# Patient Record
Sex: FEMALE | Race: WHITE | NOT HISPANIC OR LATINO | Employment: OTHER | URBAN - METROPOLITAN AREA
[De-identification: names, ages, dates, MRNs, and addresses within clinical notes are randomized per-mention and may not be internally consistent; named-entity substitution may affect disease eponyms.]

---

## 2017-07-13 ENCOUNTER — TRANSCRIBE ORDERS (OUTPATIENT)
Dept: ADMINISTRATIVE | Facility: HOSPITAL | Age: 67
End: 2017-07-13

## 2017-07-13 DIAGNOSIS — Z12.31 ENCOUNTER FOR MAMMOGRAM TO ESTABLISH BASELINE MAMMOGRAM: Primary | ICD-10-CM

## 2017-08-02 ENCOUNTER — HOSPITAL ENCOUNTER (OUTPATIENT)
Dept: MAMMOGRAPHY | Facility: CLINIC | Age: 67
Discharge: HOME | End: 2017-08-02
Payer: MEDICARE

## 2017-08-02 DIAGNOSIS — Z12.31 ENCOUNTER FOR MAMMOGRAM TO ESTABLISH BASELINE MAMMOGRAM: ICD-10-CM

## 2017-08-02 PROCEDURE — G0202 SCR MAMMO BI INCL CAD: HCPCS

## 2017-08-02 PROCEDURE — 77063 BREAST TOMOSYNTHESIS BI: CPT

## 2017-08-04 ENCOUNTER — HOSPITAL ENCOUNTER (OUTPATIENT)
Dept: CT IMAGING | Facility: HOSPITAL | Age: 67
Discharge: HOME | End: 2017-08-04
Payer: MEDICARE

## 2017-08-04 DIAGNOSIS — Z12.31 ENCOUNTER FOR MAMMOGRAM TO ESTABLISH BASELINE MAMMOGRAM: ICD-10-CM

## 2017-08-04 PROCEDURE — 71250 CT THORAX DX C-: CPT

## 2017-09-28 ENCOUNTER — OFFICE VISIT (OUTPATIENT)
Dept: URGENT CARE | Facility: CLINIC | Age: 67
End: 2017-09-28
Payer: MEDICARE

## 2017-09-28 PROCEDURE — G0463 HOSPITAL OUTPT CLINIC VISIT: HCPCS

## 2017-09-28 PROCEDURE — 99203 OFFICE O/P NEW LOW 30 MIN: CPT

## 2017-10-11 NOTE — PROGRESS NOTES
Assessment  1  Finger laceration (883 0) (A27 785H)    Plan  Finger laceration    · Dressings; Status:Complete;   Done: [id number]    Discussion/Summary  Discussion Summary:   Steri strips applied to the finger, 3 in all, wound cleaned and wrapped with splint to the fingerwell happy  Medication Side Effects Reviewed: Possible side effects of new medications were reviewed with the patient/guardian today  Understands and agrees with treatment plan: The treatment plan was reviewed with the patient/guardian  The patient/guardian understands and agrees with the treatment plan   Counseling Documentation With Imm: The patient, patient's family was counseled regarding instructions for management,-patient and family education,-importance of compliance with treatment  Follow Up Instructions: Follow Up with your Primary Care Provider in 1-2 days  If your symptoms worsen, go to the nearest [facility] Emergency Department  Chief Complaint  1  Skin Lesions  Chief Complaint Free Text Note Form: Pt states she cut her left middle finger on a knife      History of Present Illness  HPI: 80 yo female with 3cm lac to the middle finger on the left hand  Injury happened one hr ago  Bleeding controlled on arrival, no pain to the area  Discussed with pt about small sutures to the area, steri strips to the wound with splint and pt refused sutures and wanted steri strips  She was offered to have topical ointment to the wound to suture and refused, Pt was also offered tetanus due to not knowing when last was and refused  Pt was happy with treatment and happy with steri strips to the left middle finger  Hospital Based Practices Required Assessment:   Pain Assessment   the patient states they have pain  (on a scale of 0 to 10, the patient rates the pain at 1 )   Abuse And Domestic Violence Screen   Domestic violence screen not done today  Reason DV Screen not done: not alone    Depression And Suicide Screen     Suicide screen not done today  -Reason suicide screen not done: not alone  Prefered Language is  [place]  Primary Language is  English  Review of Systems  Focused-Female:   Constitutional: as noted in HPI  Musculoskeletal: no complaints of arthralgia, no myalgia, no joint swelling or stiffness, no limb pain or swelling  Integumentary: as noted in HPI  ROS Reviewed:   ROS reviewed  Social History   · Alcohol use (V49 89) (Z78 9)   · Caffeine use (V49 89) (F15 90)   · Never a smoker  Social History Reviewed: The social history was reviewed and updated today  The social history was reviewed and is unchanged  Current Meds   1  Fosamax 10 MG TABS; Therapy: (Recorded:[id number]) to Recorded  Medication List Reviewed: The medication list was reviewed and updated today  Allergies  1  No Known Drug Allergies  2  No Known Environmental Allergies   3  No Known Food Allergies    Vitals  Signs   Recorded: [id number] 06:36PM   Temperature: 97 6 F  Heart Rate: 84  Respiration: 16  Systolic: 800  Diastolic: 76  Height: 5 ft 7 in  Weight: 129 lb   BMI Calculated: 20 2  BSA Calculated: 1 68  Pain Scale: 197    Physical Exam    Constitutional   General appearance: No acute distress, well appearing and well nourished  Musculoskeletal   Digits and nails: Abnormal  -small lac 2 cm to the distal middle finger of theleft hand, bleeding controlled, wound linear, no deficits, wound superficial    Inspection/palpation of joints, bones, and muscles: Normal  -FROM  Skin   Skin and subcutaneous tissue: Abnormal  -no s/s of infection, wound cleansed with saline and betadine,  Neurologic   Sensation: No sensory loss  Psychiatric   Orientation to person, place, and time: Normal     Mood and affect: Normal        Procedure    Procedure: wound repair  The wound was located on the and was 2 cm in length (left middle finger)  The wound was linear-and-was clean  the neurovascular exam was normal  there was no tendon injury  The site was prepped with Betadine and irrigated extensively  refused topical LET and sutures to the wound, steri strips wanted   Closure: The cutaneous layer was closed with Steri-Strips  Dressing: a sterile dressing was placed  Patient Status:  the patient tolerated the procedure well  There were no complications      Signatures   Electronically signed by :  ANASTASIA Jeff; Sep 28 2017  7:11PM EST                       (Author)    Electronically signed by : Jayjay Luna DO; Oct 10 2017  7:02PM EST                       (Co-author)

## 2018-04-16 ENCOUNTER — HOSPITAL ENCOUNTER (OUTPATIENT)
Dept: BONE DENSITY | Facility: IMAGING CENTER | Age: 68
Discharge: HOME | End: 2018-04-16
Payer: MEDICARE

## 2018-04-16 DIAGNOSIS — Z12.31 ENCOUNTER FOR MAMMOGRAM TO ESTABLISH BASELINE MAMMOGRAM: ICD-10-CM

## 2018-04-16 PROCEDURE — 77080 DXA BONE DENSITY AXIAL: CPT

## 2018-07-24 ENCOUNTER — TRANSCRIBE ORDERS (OUTPATIENT)
Dept: ADMINISTRATIVE | Facility: HOSPITAL | Age: 68
End: 2018-07-24

## 2018-07-24 DIAGNOSIS — Z12.39 SCREENING BREAST EXAMINATION: Primary | ICD-10-CM

## 2018-08-06 ENCOUNTER — HOSPITAL ENCOUNTER (OUTPATIENT)
Dept: MAMMOGRAPHY | Facility: CLINIC | Age: 68
Discharge: HOME | End: 2018-08-06
Payer: MEDICARE

## 2018-08-06 ENCOUNTER — TRANSCRIBE ORDERS (OUTPATIENT)
Dept: ADMINISTRATIVE | Facility: HOSPITAL | Age: 68
End: 2018-08-06

## 2018-08-06 DIAGNOSIS — Z12.39 SCREENING BREAST EXAMINATION: ICD-10-CM

## 2018-08-06 DIAGNOSIS — R91.1 LUNG NODULE: Primary | ICD-10-CM

## 2018-08-06 PROCEDURE — 77063 BREAST TOMOSYNTHESIS BI: CPT

## 2018-08-06 PROCEDURE — 77067 SCR MAMMO BI INCL CAD: CPT

## 2018-08-09 ENCOUNTER — TRANSCRIBE ORDERS (OUTPATIENT)
Dept: ADMINISTRATIVE | Facility: HOSPITAL | Age: 68
End: 2018-08-09

## 2018-08-09 ENCOUNTER — APPOINTMENT (OUTPATIENT)
Dept: LAB | Facility: HOSPITAL | Age: 68
End: 2018-08-09
Payer: MEDICARE

## 2018-08-09 DIAGNOSIS — D50.0 IRON DEFICIENCY ANEMIA DUE TO CHRONIC BLOOD LOSS: ICD-10-CM

## 2018-08-09 DIAGNOSIS — E78.00 PURE HYPERCHOLESTEROLEMIA: ICD-10-CM

## 2018-08-09 DIAGNOSIS — E08.00 DIABETES MELLITUS DUE TO UNDERLYING CONDITION WITH HYPEROSMOLARITY WITHOUT COMA, WITHOUT LONG-TERM CURRENT USE OF INSULIN (HCC): ICD-10-CM

## 2018-08-09 DIAGNOSIS — N39.0 URINARY TRACT INFECTION WITHOUT HEMATURIA, SITE UNSPECIFIED: ICD-10-CM

## 2018-08-09 DIAGNOSIS — E08.00 DIABETES MELLITUS DUE TO UNDERLYING CONDITION WITH HYPEROSMOLARITY WITHOUT COMA, WITHOUT LONG-TERM CURRENT USE OF INSULIN (HCC): Primary | ICD-10-CM

## 2018-08-09 DIAGNOSIS — E03.9 MYXEDEMA HEART DISEASE: ICD-10-CM

## 2018-08-09 DIAGNOSIS — I51.9 MYXEDEMA HEART DISEASE: ICD-10-CM

## 2018-08-09 LAB
ALBUMIN SERPL BCP-MCNC: 3.8 G/DL (ref 3.5–5)
ALP SERPL-CCNC: 74 U/L (ref 46–116)
ALT SERPL W P-5'-P-CCNC: 30 U/L (ref 12–78)
ANION GAP SERPL CALCULATED.3IONS-SCNC: 4 MMOL/L (ref 4–13)
AST SERPL W P-5'-P-CCNC: 20 U/L (ref 5–45)
BACTERIA UR QL AUTO: ABNORMAL /HPF
BASOPHILS # BLD AUTO: 0.03 THOUSANDS/ΜL (ref 0–0.1)
BASOPHILS NFR BLD AUTO: 1 % (ref 0–1)
BILIRUB SERPL-MCNC: 0.6 MG/DL (ref 0.2–1)
BILIRUB UR QL STRIP: NEGATIVE
BUN SERPL-MCNC: 9 MG/DL (ref 5–25)
CALCIUM SERPL-MCNC: 9.4 MG/DL (ref 8.3–10.1)
CHLORIDE SERPL-SCNC: 104 MMOL/L (ref 100–108)
CHOLEST SERPL-MCNC: 215 MG/DL (ref 50–200)
CLARITY UR: CLEAR
CO2 SERPL-SCNC: 32 MMOL/L (ref 21–32)
COLOR UR: YELLOW
CREAT SERPL-MCNC: 0.62 MG/DL (ref 0.6–1.3)
EOSINOPHIL # BLD AUTO: 0.06 THOUSAND/ΜL (ref 0–0.61)
EOSINOPHIL NFR BLD AUTO: 1 % (ref 0–6)
ERYTHROCYTE [DISTWIDTH] IN BLOOD BY AUTOMATED COUNT: 13.1 % (ref 11.6–15.1)
GFR SERPL CREATININE-BSD FRML MDRD: 94 ML/MIN/1.73SQ M
GLUCOSE P FAST SERPL-MCNC: 95 MG/DL (ref 65–99)
GLUCOSE UR STRIP-MCNC: NEGATIVE MG/DL
HCT VFR BLD AUTO: 38.9 % (ref 34.8–46.1)
HDLC SERPL-MCNC: 118 MG/DL (ref 40–60)
HGB BLD-MCNC: 12.9 G/DL (ref 11.5–15.4)
HGB UR QL STRIP.AUTO: NEGATIVE
IMM GRANULOCYTES # BLD AUTO: 0.01 THOUSAND/UL (ref 0–0.2)
IMM GRANULOCYTES NFR BLD AUTO: 0 % (ref 0–2)
KETONES UR STRIP-MCNC: NEGATIVE MG/DL
LDLC SERPL CALC-MCNC: 87 MG/DL (ref 0–100)
LEUKOCYTE ESTERASE UR QL STRIP: ABNORMAL
LYMPHOCYTES # BLD AUTO: 2.21 THOUSANDS/ΜL (ref 0.6–4.47)
LYMPHOCYTES NFR BLD AUTO: 45 % (ref 14–44)
MCH RBC QN AUTO: 32 PG (ref 26.8–34.3)
MCHC RBC AUTO-ENTMCNC: 33.2 G/DL (ref 31.4–37.4)
MCV RBC AUTO: 97 FL (ref 82–98)
MONOCYTES # BLD AUTO: 0.42 THOUSAND/ΜL (ref 0.17–1.22)
MONOCYTES NFR BLD AUTO: 9 % (ref 4–12)
NEUTROPHILS # BLD AUTO: 2.24 THOUSANDS/ΜL (ref 1.85–7.62)
NEUTS SEG NFR BLD AUTO: 44 % (ref 43–75)
NITRITE UR QL STRIP: NEGATIVE
NON-SQ EPI CELLS URNS QL MICRO: ABNORMAL /HPF
NONHDLC SERPL-MCNC: 97 MG/DL
NRBC BLD AUTO-RTO: 0 /100 WBCS
PH UR STRIP.AUTO: 7 [PH] (ref 4.5–8)
PLATELET # BLD AUTO: 269 THOUSANDS/UL (ref 149–390)
PMV BLD AUTO: 10.1 FL (ref 8.9–12.7)
POTASSIUM SERPL-SCNC: 3.9 MMOL/L (ref 3.5–5.3)
PROT SERPL-MCNC: 7 G/DL (ref 6.4–8.2)
PROT UR STRIP-MCNC: NEGATIVE MG/DL
RBC # BLD AUTO: 4.03 MILLION/UL (ref 3.81–5.12)
RBC #/AREA URNS AUTO: ABNORMAL /HPF
SODIUM SERPL-SCNC: 140 MMOL/L (ref 136–145)
SP GR UR STRIP.AUTO: 1.01 (ref 1–1.03)
TRIGL SERPL-MCNC: 51 MG/DL
TSH SERPL DL<=0.05 MIU/L-ACNC: 0.66 UIU/ML (ref 0.36–3.74)
UROBILINOGEN UR QL STRIP.AUTO: 0.2 E.U./DL
WBC # BLD AUTO: 4.97 THOUSAND/UL (ref 4.31–10.16)
WBC #/AREA URNS AUTO: ABNORMAL /HPF

## 2018-08-09 PROCEDURE — 84443 ASSAY THYROID STIM HORMONE: CPT

## 2018-08-09 PROCEDURE — 80061 LIPID PANEL: CPT

## 2018-08-09 PROCEDURE — 85025 COMPLETE CBC W/AUTO DIFF WBC: CPT

## 2018-08-09 PROCEDURE — 80053 COMPREHEN METABOLIC PANEL: CPT

## 2018-08-09 PROCEDURE — 81001 URINALYSIS AUTO W/SCOPE: CPT | Performed by: FAMILY MEDICINE

## 2018-08-09 PROCEDURE — 36415 COLL VENOUS BLD VENIPUNCTURE: CPT

## 2018-08-10 ENCOUNTER — HOSPITAL ENCOUNTER (OUTPATIENT)
Dept: CT IMAGING | Facility: HOSPITAL | Age: 68
Discharge: HOME | End: 2018-08-10
Payer: MEDICARE

## 2018-08-10 DIAGNOSIS — R91.1 LUNG NODULE: ICD-10-CM

## 2018-08-10 PROCEDURE — 71250 CT THORAX DX C-: CPT

## 2019-10-10 ENCOUNTER — TRANSCRIBE ORDERS (OUTPATIENT)
Dept: ADMINISTRATIVE | Facility: HOSPITAL | Age: 69
End: 2019-10-10

## 2019-10-10 DIAGNOSIS — D51.0 PERNICIOUS ANEMIA: ICD-10-CM

## 2019-10-10 DIAGNOSIS — E08.00 DIABETES MELLITUS DUE TO UNDERLYING CONDITION WITH HYPEROSMOLARITY WITHOUT COMA, WITHOUT LONG-TERM CURRENT USE OF INSULIN (HCC): ICD-10-CM

## 2019-10-10 DIAGNOSIS — D50.0 IRON DEFICIENCY ANEMIA DUE TO CHRONIC BLOOD LOSS: ICD-10-CM

## 2019-10-10 DIAGNOSIS — Z12.31 SCREENING MAMMOGRAM, ENCOUNTER FOR: Primary | ICD-10-CM

## 2019-10-10 DIAGNOSIS — D59.2: ICD-10-CM

## 2019-10-10 DIAGNOSIS — E55.9 AVITAMINOSIS D: ICD-10-CM

## 2019-10-10 DIAGNOSIS — E78.9 DISORDER OF LIPOPROTEIN AND LIPID METABOLISM: ICD-10-CM

## 2019-10-25 ENCOUNTER — APPOINTMENT (OUTPATIENT)
Dept: LAB | Facility: HOSPITAL | Age: 69
End: 2019-10-25
Payer: MEDICARE

## 2019-10-25 DIAGNOSIS — D59.2: ICD-10-CM

## 2019-10-25 DIAGNOSIS — E78.9 DISORDER OF LIPOPROTEIN AND LIPID METABOLISM: ICD-10-CM

## 2019-10-25 DIAGNOSIS — D50.0 IRON DEFICIENCY ANEMIA DUE TO CHRONIC BLOOD LOSS: ICD-10-CM

## 2019-10-25 DIAGNOSIS — Z12.31 SCREENING MAMMOGRAM, ENCOUNTER FOR: ICD-10-CM

## 2019-10-25 DIAGNOSIS — E55.9 AVITAMINOSIS D: ICD-10-CM

## 2019-10-25 DIAGNOSIS — E08.00 DIABETES MELLITUS DUE TO UNDERLYING CONDITION WITH HYPEROSMOLARITY WITHOUT COMA, WITHOUT LONG-TERM CURRENT USE OF INSULIN (HCC): ICD-10-CM

## 2019-10-25 DIAGNOSIS — D51.0 PERNICIOUS ANEMIA: ICD-10-CM

## 2019-10-25 LAB
25(OH)D3 SERPL-MCNC: 30.1 NG/ML (ref 30–100)
ALBUMIN SERPL BCP-MCNC: 3.6 G/DL (ref 3.5–5)
ALP SERPL-CCNC: 65 U/L (ref 46–116)
ALT SERPL W P-5'-P-CCNC: 23 U/L (ref 12–78)
ANION GAP SERPL CALCULATED.3IONS-SCNC: 9 MMOL/L (ref 4–13)
AST SERPL W P-5'-P-CCNC: 18 U/L (ref 5–45)
BACTERIA UR QL AUTO: ABNORMAL /HPF
BASOPHILS # BLD AUTO: 0.02 THOUSANDS/ΜL (ref 0–0.1)
BASOPHILS NFR BLD AUTO: 1 % (ref 0–1)
BILIRUB SERPL-MCNC: 0.6 MG/DL (ref 0.2–1)
BILIRUB UR QL STRIP: NEGATIVE
BUN SERPL-MCNC: 7 MG/DL (ref 5–25)
CALCIUM SERPL-MCNC: 8.4 MG/DL (ref 8.3–10.1)
CHLORIDE SERPL-SCNC: 104 MMOL/L (ref 100–108)
CHOLEST SERPL-MCNC: 197 MG/DL (ref 50–200)
CLARITY UR: ABNORMAL
CO2 SERPL-SCNC: 28 MMOL/L (ref 21–32)
COLOR UR: YELLOW
CREAT SERPL-MCNC: 0.58 MG/DL (ref 0.6–1.3)
EOSINOPHIL # BLD AUTO: 0.08 THOUSAND/ΜL (ref 0–0.61)
EOSINOPHIL NFR BLD AUTO: 3 % (ref 0–6)
ERYTHROCYTE [DISTWIDTH] IN BLOOD BY AUTOMATED COUNT: 12.6 % (ref 11.6–15.1)
FOLATE SERPL-MCNC: >20 NG/ML (ref 3.1–17.5)
GFR SERPL CREATININE-BSD FRML MDRD: 94 ML/MIN/1.73SQ M
GLUCOSE P FAST SERPL-MCNC: 91 MG/DL (ref 65–99)
GLUCOSE UR STRIP-MCNC: NEGATIVE MG/DL
HCT VFR BLD AUTO: 39.7 % (ref 34.8–46.1)
HDLC SERPL-MCNC: 103 MG/DL
HGB BLD-MCNC: 13 G/DL (ref 11.5–15.4)
HGB UR QL STRIP.AUTO: NEGATIVE
IMM GRANULOCYTES # BLD AUTO: 0.01 THOUSAND/UL (ref 0–0.2)
IMM GRANULOCYTES NFR BLD AUTO: 0 % (ref 0–2)
KETONES UR STRIP-MCNC: NEGATIVE MG/DL
LDLC SERPL CALC-MCNC: 87 MG/DL (ref 0–100)
LEUKOCYTE ESTERASE UR QL STRIP: ABNORMAL
LYMPHOCYTES # BLD AUTO: 1.45 THOUSANDS/ΜL (ref 0.6–4.47)
LYMPHOCYTES NFR BLD AUTO: 46 % (ref 14–44)
MCH RBC QN AUTO: 32 PG (ref 26.8–34.3)
MCHC RBC AUTO-ENTMCNC: 32.7 G/DL (ref 31.4–37.4)
MCV RBC AUTO: 98 FL (ref 82–98)
MONOCYTES # BLD AUTO: 0.32 THOUSAND/ΜL (ref 0.17–1.22)
MONOCYTES NFR BLD AUTO: 10 % (ref 4–12)
MUCOUS THREADS UR QL AUTO: ABNORMAL
NEUTROPHILS # BLD AUTO: 1.25 THOUSANDS/ΜL (ref 1.85–7.62)
NEUTS SEG NFR BLD AUTO: 40 % (ref 43–75)
NITRITE UR QL STRIP: NEGATIVE
NON-SQ EPI CELLS URNS QL MICRO: ABNORMAL /HPF
NONHDLC SERPL-MCNC: 94 MG/DL
NRBC BLD AUTO-RTO: 0 /100 WBCS
PH UR STRIP.AUTO: 7.5 [PH]
PLATELET # BLD AUTO: 276 THOUSANDS/UL (ref 149–390)
PMV BLD AUTO: 9.9 FL (ref 8.9–12.7)
POTASSIUM SERPL-SCNC: 4 MMOL/L (ref 3.5–5.3)
PROT SERPL-MCNC: 6.7 G/DL (ref 6.4–8.2)
PROT UR STRIP-MCNC: NEGATIVE MG/DL
RBC # BLD AUTO: 4.06 MILLION/UL (ref 3.81–5.12)
RBC #/AREA URNS AUTO: ABNORMAL /HPF
SODIUM SERPL-SCNC: 141 MMOL/L (ref 136–145)
SP GR UR STRIP.AUTO: 1.01 (ref 1–1.03)
T4 FREE SERPL-MCNC: 1 NG/DL (ref 0.76–1.46)
TRIGL SERPL-MCNC: 37 MG/DL
TSH SERPL DL<=0.05 MIU/L-ACNC: 1.18 UIU/ML (ref 0.36–3.74)
UROBILINOGEN UR QL STRIP.AUTO: 0.2 E.U./DL
VIT B12 SERPL-MCNC: 256 PG/ML (ref 100–900)
WBC # BLD AUTO: 3.13 THOUSAND/UL (ref 4.31–10.16)
WBC #/AREA URNS AUTO: ABNORMAL /HPF

## 2019-10-25 PROCEDURE — 85025 COMPLETE CBC W/AUTO DIFF WBC: CPT

## 2019-10-25 PROCEDURE — 36415 COLL VENOUS BLD VENIPUNCTURE: CPT

## 2019-10-25 PROCEDURE — 81001 URINALYSIS AUTO W/SCOPE: CPT | Performed by: FAMILY MEDICINE

## 2019-10-25 PROCEDURE — 84439 ASSAY OF FREE THYROXINE: CPT

## 2019-10-25 PROCEDURE — 82306 VITAMIN D 25 HYDROXY: CPT

## 2019-10-25 PROCEDURE — 80053 COMPREHEN METABOLIC PANEL: CPT

## 2019-10-25 PROCEDURE — 82607 VITAMIN B-12: CPT

## 2019-10-25 PROCEDURE — 84443 ASSAY THYROID STIM HORMONE: CPT

## 2019-10-25 PROCEDURE — 87086 URINE CULTURE/COLONY COUNT: CPT

## 2019-10-25 PROCEDURE — 82746 ASSAY OF FOLIC ACID SERUM: CPT

## 2019-10-25 PROCEDURE — 80061 LIPID PANEL: CPT

## 2019-10-27 LAB
BACTERIA UR CULT: NORMAL
BACTERIA UR CULT: NORMAL

## 2019-10-30 ENCOUNTER — HOSPITAL ENCOUNTER (OUTPATIENT)
Dept: MAMMOGRAPHY | Facility: CLINIC | Age: 69
Discharge: HOME | End: 2019-10-30
Payer: MEDICARE

## 2019-10-30 VITALS — BODY MASS INDEX: 20.4 KG/M2 | HEIGHT: 67 IN | WEIGHT: 130 LBS

## 2019-10-30 DIAGNOSIS — Z12.31 SCREENING MAMMOGRAM, ENCOUNTER FOR: ICD-10-CM

## 2019-10-30 PROCEDURE — 77067 SCR MAMMO BI INCL CAD: CPT

## 2019-10-30 PROCEDURE — 77063 BREAST TOMOSYNTHESIS BI: CPT

## 2019-11-04 ENCOUNTER — TRANSCRIBE ORDERS (OUTPATIENT)
Dept: ADMINISTRATIVE | Facility: HOSPITAL | Age: 69
End: 2019-11-04

## 2019-11-04 DIAGNOSIS — R91.1 COIN LESION: Primary | ICD-10-CM

## 2019-11-08 ENCOUNTER — HOSPITAL ENCOUNTER (OUTPATIENT)
Dept: CT IMAGING | Facility: HOSPITAL | Age: 69
Discharge: HOME | End: 2019-11-08
Payer: MEDICARE

## 2019-11-08 ENCOUNTER — TRANSCRIBE ORDERS (OUTPATIENT)
Dept: ADMINISTRATIVE | Facility: HOSPITAL | Age: 69
End: 2019-11-08

## 2019-11-08 ENCOUNTER — APPOINTMENT (OUTPATIENT)
Dept: LAB | Facility: HOSPITAL | Age: 69
End: 2019-11-08
Payer: MEDICARE

## 2019-11-08 DIAGNOSIS — E78.5 HYPERLIPIDEMIA, UNSPECIFIED HYPERLIPIDEMIA TYPE: Primary | ICD-10-CM

## 2019-11-08 DIAGNOSIS — D64.9 ANEMIA, UNSPECIFIED TYPE: ICD-10-CM

## 2019-11-08 DIAGNOSIS — E78.5 HYPERLIPIDEMIA, UNSPECIFIED HYPERLIPIDEMIA TYPE: ICD-10-CM

## 2019-11-08 DIAGNOSIS — R91.1 COIN LESION: ICD-10-CM

## 2019-11-08 LAB
BASOPHILS # BLD AUTO: 0.05 THOUSANDS/ΜL (ref 0–0.1)
BASOPHILS NFR BLD AUTO: 1 % (ref 0–1)
EOSINOPHIL # BLD AUTO: 0.07 THOUSAND/ΜL (ref 0–0.61)
EOSINOPHIL NFR BLD AUTO: 1 % (ref 0–6)
ERYTHROCYTE [DISTWIDTH] IN BLOOD BY AUTOMATED COUNT: 12.5 % (ref 11.6–15.1)
HCT VFR BLD AUTO: 39 % (ref 34.8–46.1)
HGB BLD-MCNC: 12.7 G/DL (ref 11.5–15.4)
IMM GRANULOCYTES # BLD AUTO: 0.01 THOUSAND/UL (ref 0–0.2)
IMM GRANULOCYTES NFR BLD AUTO: 0 % (ref 0–2)
LYMPHOCYTES # BLD AUTO: 2.27 THOUSANDS/ΜL (ref 0.6–4.47)
LYMPHOCYTES NFR BLD AUTO: 44 % (ref 14–44)
MCH RBC QN AUTO: 32.2 PG (ref 26.8–34.3)
MCHC RBC AUTO-ENTMCNC: 32.6 G/DL (ref 31.4–37.4)
MCV RBC AUTO: 99 FL (ref 82–98)
MONOCYTES # BLD AUTO: 0.53 THOUSAND/ΜL (ref 0.17–1.22)
MONOCYTES NFR BLD AUTO: 10 % (ref 4–12)
NEUTROPHILS # BLD AUTO: 2.28 THOUSANDS/ΜL (ref 1.85–7.62)
NEUTS SEG NFR BLD AUTO: 44 % (ref 43–75)
NRBC BLD AUTO-RTO: 0 /100 WBCS
PLATELET # BLD AUTO: 315 THOUSANDS/UL (ref 149–390)
PMV BLD AUTO: 10.2 FL (ref 8.9–12.7)
RBC # BLD AUTO: 3.95 MILLION/UL (ref 3.81–5.12)
WBC # BLD AUTO: 5.21 THOUSAND/UL (ref 4.31–10.16)

## 2019-11-08 PROCEDURE — 85025 COMPLETE CBC W/AUTO DIFF WBC: CPT

## 2019-11-08 PROCEDURE — 71250 CT THORAX DX C-: CPT

## 2019-11-08 PROCEDURE — 36415 COLL VENOUS BLD VENIPUNCTURE: CPT

## 2019-11-09 ENCOUNTER — APPOINTMENT (OUTPATIENT)
Dept: LAB | Facility: HOSPITAL | Age: 69
End: 2019-11-09
Payer: MEDICARE

## 2019-11-09 ENCOUNTER — TRANSCRIBE ORDERS (OUTPATIENT)
Dept: ADMINISTRATIVE | Facility: HOSPITAL | Age: 69
End: 2019-11-09

## 2019-11-09 DIAGNOSIS — Z12.11 SPECIAL SCREENING FOR MALIGNANT NEOPLASMS, COLON: ICD-10-CM

## 2019-11-09 DIAGNOSIS — Z12.11 SPECIAL SCREENING FOR MALIGNANT NEOPLASMS, COLON: Primary | ICD-10-CM

## 2019-11-09 LAB — HEMOCCULT STL QL IA: NEGATIVE

## 2019-11-09 PROCEDURE — G0328 FECAL BLOOD SCRN IMMUNOASSAY: HCPCS

## 2020-09-28 ENCOUNTER — TRANSCRIBE ORDERS (OUTPATIENT)
Dept: ADMINISTRATIVE | Facility: HOSPITAL | Age: 70
End: 2020-09-28

## 2020-09-28 DIAGNOSIS — R91.8 LUNG MASS: Primary | ICD-10-CM

## 2020-09-28 DIAGNOSIS — E03.9 MYXEDEMA HEART DISEASE: ICD-10-CM

## 2020-09-28 DIAGNOSIS — E78.00 PURE HYPERCHOLESTEROLEMIA: ICD-10-CM

## 2020-09-28 DIAGNOSIS — Z12.31 ENCOUNTER FOR SCREENING MAMMOGRAM FOR MALIGNANT NEOPLASM OF BREAST: ICD-10-CM

## 2020-09-28 DIAGNOSIS — I51.9 MYXEDEMA HEART DISEASE: ICD-10-CM

## 2020-09-28 DIAGNOSIS — M81.0 SENILE OSTEOPOROSIS: ICD-10-CM

## 2020-09-28 DIAGNOSIS — I10 ESSENTIAL HYPERTENSION, MALIGNANT: Primary | ICD-10-CM

## 2020-10-01 ENCOUNTER — APPOINTMENT (OUTPATIENT)
Dept: LAB | Facility: HOSPITAL | Age: 70
End: 2020-10-01
Payer: MEDICARE

## 2020-10-01 DIAGNOSIS — I10 ESSENTIAL HYPERTENSION, MALIGNANT: ICD-10-CM

## 2020-10-01 DIAGNOSIS — M81.0 SENILE OSTEOPOROSIS: ICD-10-CM

## 2020-10-01 DIAGNOSIS — E03.9 MYXEDEMA HEART DISEASE: ICD-10-CM

## 2020-10-01 DIAGNOSIS — E78.00 PURE HYPERCHOLESTEROLEMIA: ICD-10-CM

## 2020-10-01 DIAGNOSIS — I51.9 MYXEDEMA HEART DISEASE: ICD-10-CM

## 2020-10-01 LAB
25(OH)D3 SERPL-MCNC: 35.7 NG/ML (ref 30–100)
ALBUMIN SERPL BCP-MCNC: 4 G/DL (ref 3.5–5)
ALP SERPL-CCNC: 65 U/L (ref 46–116)
ALT SERPL W P-5'-P-CCNC: 29 U/L (ref 12–78)
ANION GAP SERPL CALCULATED.3IONS-SCNC: 4 MMOL/L (ref 4–13)
AST SERPL W P-5'-P-CCNC: 19 U/L (ref 5–45)
BACTERIA UR QL AUTO: ABNORMAL /HPF
BASOPHILS # BLD AUTO: 0.02 THOUSANDS/ΜL (ref 0–0.1)
BASOPHILS NFR BLD AUTO: 1 % (ref 0–1)
BILIRUB SERPL-MCNC: 0.58 MG/DL (ref 0.2–1)
BILIRUB UR QL STRIP: NEGATIVE
BUN SERPL-MCNC: 9 MG/DL (ref 5–25)
CALCIUM SERPL-MCNC: 9.4 MG/DL (ref 8.3–10.1)
CHLORIDE SERPL-SCNC: 106 MMOL/L (ref 100–108)
CHOLEST SERPL-MCNC: 199 MG/DL (ref 50–200)
CLARITY UR: CLEAR
CO2 SERPL-SCNC: 28 MMOL/L (ref 21–32)
COLOR UR: YELLOW
CREAT SERPL-MCNC: 0.68 MG/DL (ref 0.6–1.3)
EOSINOPHIL # BLD AUTO: 0.06 THOUSAND/ΜL (ref 0–0.61)
EOSINOPHIL NFR BLD AUTO: 2 % (ref 0–6)
ERYTHROCYTE [DISTWIDTH] IN BLOOD BY AUTOMATED COUNT: 12.6 % (ref 11.6–15.1)
GFR SERPL CREATININE-BSD FRML MDRD: 89 ML/MIN/1.73SQ M
GLUCOSE P FAST SERPL-MCNC: 92 MG/DL (ref 65–99)
GLUCOSE UR STRIP-MCNC: NEGATIVE MG/DL
HCT VFR BLD AUTO: 39.2 % (ref 34.8–46.1)
HDLC SERPL-MCNC: 91 MG/DL
HGB BLD-MCNC: 13.2 G/DL (ref 11.5–15.4)
HGB UR QL STRIP.AUTO: NEGATIVE
HYALINE CASTS #/AREA URNS LPF: ABNORMAL /LPF
IMM GRANULOCYTES # BLD AUTO: 0.01 THOUSAND/UL (ref 0–0.2)
IMM GRANULOCYTES NFR BLD AUTO: 0 % (ref 0–2)
KETONES UR STRIP-MCNC: NEGATIVE MG/DL
LDLC SERPL CALC-MCNC: 94 MG/DL (ref 0–100)
LEUKOCYTE ESTERASE UR QL STRIP: ABNORMAL
LYMPHOCYTES # BLD AUTO: 1.97 THOUSANDS/ΜL (ref 0.6–4.47)
LYMPHOCYTES NFR BLD AUTO: 49 % (ref 14–44)
MCH RBC QN AUTO: 32.6 PG (ref 26.8–34.3)
MCHC RBC AUTO-ENTMCNC: 33.7 G/DL (ref 31.4–37.4)
MCV RBC AUTO: 97 FL (ref 82–98)
MONOCYTES # BLD AUTO: 0.45 THOUSAND/ΜL (ref 0.17–1.22)
MONOCYTES NFR BLD AUTO: 12 % (ref 4–12)
NEUTROPHILS # BLD AUTO: 1.39 THOUSANDS/ΜL (ref 1.85–7.62)
NEUTS SEG NFR BLD AUTO: 36 % (ref 43–75)
NITRITE UR QL STRIP: NEGATIVE
NON-SQ EPI CELLS URNS QL MICRO: ABNORMAL /HPF
NONHDLC SERPL-MCNC: 108 MG/DL
NRBC BLD AUTO-RTO: 0 /100 WBCS
PH UR STRIP.AUTO: 7 [PH]
PLATELET # BLD AUTO: 272 THOUSANDS/UL (ref 149–390)
PMV BLD AUTO: 10.4 FL (ref 8.9–12.7)
POTASSIUM SERPL-SCNC: 4.6 MMOL/L (ref 3.5–5.3)
PROT SERPL-MCNC: 7.2 G/DL (ref 6.4–8.2)
PROT UR STRIP-MCNC: NEGATIVE MG/DL
RBC # BLD AUTO: 4.05 MILLION/UL (ref 3.81–5.12)
RBC #/AREA URNS AUTO: ABNORMAL /HPF
SODIUM SERPL-SCNC: 138 MMOL/L (ref 136–145)
SP GR UR STRIP.AUTO: 1.01 (ref 1–1.03)
T4 FREE SERPL-MCNC: 1.14 NG/DL (ref 0.76–1.46)
TRIGL SERPL-MCNC: 71 MG/DL
TSH SERPL DL<=0.05 MIU/L-ACNC: 0.76 UIU/ML (ref 0.36–3.74)
UROBILINOGEN UR QL STRIP.AUTO: 0.2 E.U./DL
VIT B12 SERPL-MCNC: 529 PG/ML (ref 100–900)
WBC # BLD AUTO: 3.9 THOUSAND/UL (ref 4.31–10.16)
WBC #/AREA URNS AUTO: ABNORMAL /HPF

## 2020-10-01 PROCEDURE — 81001 URINALYSIS AUTO W/SCOPE: CPT | Performed by: FAMILY MEDICINE

## 2020-10-01 PROCEDURE — 84443 ASSAY THYROID STIM HORMONE: CPT

## 2020-10-01 PROCEDURE — 36415 COLL VENOUS BLD VENIPUNCTURE: CPT

## 2020-10-01 PROCEDURE — 80061 LIPID PANEL: CPT

## 2020-10-01 PROCEDURE — 82607 VITAMIN B-12: CPT

## 2020-10-01 PROCEDURE — 82306 VITAMIN D 25 HYDROXY: CPT

## 2020-10-01 PROCEDURE — 85025 COMPLETE CBC W/AUTO DIFF WBC: CPT

## 2020-10-01 PROCEDURE — 80053 COMPREHEN METABOLIC PANEL: CPT

## 2020-10-01 PROCEDURE — 84439 ASSAY OF FREE THYROXINE: CPT

## 2020-10-02 ENCOUNTER — HOSPITAL ENCOUNTER (OUTPATIENT)
Dept: CT IMAGING | Facility: HOSPITAL | Age: 70
Discharge: HOME | End: 2020-10-02
Payer: MEDICARE

## 2020-10-02 DIAGNOSIS — R91.8 LUNG MASS: ICD-10-CM

## 2020-10-02 PROCEDURE — 71250 CT THORAX DX C-: CPT

## 2020-10-02 PROCEDURE — G1004 CDSM NDSC: HCPCS

## 2020-11-02 ENCOUNTER — HOSPITAL ENCOUNTER (OUTPATIENT)
Dept: MAMMOGRAPHY | Facility: CLINIC | Age: 70
Discharge: HOME | End: 2020-11-02
Payer: MEDICARE

## 2020-11-02 VITALS — BODY MASS INDEX: 20.4 KG/M2 | HEIGHT: 67 IN | WEIGHT: 130 LBS

## 2020-11-02 DIAGNOSIS — Z12.31 ENCOUNTER FOR SCREENING MAMMOGRAM FOR MALIGNANT NEOPLASM OF BREAST: ICD-10-CM

## 2020-11-02 PROCEDURE — 77067 SCR MAMMO BI INCL CAD: CPT

## 2020-11-02 PROCEDURE — 77063 BREAST TOMOSYNTHESIS BI: CPT

## 2021-03-04 DIAGNOSIS — Z23 ENCOUNTER FOR IMMUNIZATION: ICD-10-CM

## 2021-03-19 ENCOUNTER — IMMUNIZATIONS (OUTPATIENT)
Dept: FAMILY MEDICINE CLINIC | Facility: HOSPITAL | Age: 71
End: 2021-03-19

## 2021-03-19 DIAGNOSIS — Z23 ENCOUNTER FOR IMMUNIZATION: Primary | ICD-10-CM

## 2021-03-19 PROCEDURE — 91300 SARS-COV-2 / COVID-19 MRNA VACCINE (PFIZER-BIONTECH) 30 MCG: CPT

## 2021-03-19 PROCEDURE — 0001A SARS-COV-2 / COVID-19 MRNA VACCINE (PFIZER-BIONTECH) 30 MCG: CPT

## 2021-04-09 ENCOUNTER — IMMUNIZATIONS (OUTPATIENT)
Dept: FAMILY MEDICINE CLINIC | Facility: HOSPITAL | Age: 71
End: 2021-04-09

## 2021-04-09 DIAGNOSIS — Z23 ENCOUNTER FOR IMMUNIZATION: Primary | ICD-10-CM

## 2021-04-09 PROCEDURE — 91300 SARS-COV-2 / COVID-19 MRNA VACCINE (PFIZER-BIONTECH) 30 MCG: CPT

## 2021-04-09 PROCEDURE — 0002A SARS-COV-2 / COVID-19 MRNA VACCINE (PFIZER-BIONTECH) 30 MCG: CPT

## 2021-11-23 ENCOUNTER — HOSPITAL ENCOUNTER (OUTPATIENT)
Dept: MAMMOGRAPHY | Facility: CLINIC | Age: 71
Discharge: HOME | End: 2021-11-23
Payer: MEDICARE

## 2021-11-23 VITALS — HEIGHT: 67 IN | WEIGHT: 130 LBS | BODY MASS INDEX: 20.4 KG/M2

## 2021-11-23 DIAGNOSIS — Z12.31 SCREENING MAMMOGRAM FOR HIGH-RISK PATIENT: ICD-10-CM

## 2021-11-23 PROCEDURE — 77067 SCR MAMMO BI INCL CAD: CPT

## 2021-11-23 PROCEDURE — 77063 BREAST TOMOSYNTHESIS BI: CPT

## 2022-01-19 ENCOUNTER — APPOINTMENT (OUTPATIENT)
Dept: RADIOLOGY | Facility: CLINIC | Age: 72
End: 2022-01-19
Payer: MEDICARE

## 2022-01-19 ENCOUNTER — OFFICE VISIT (OUTPATIENT)
Dept: PODIATRY | Facility: CLINIC | Age: 72
End: 2022-01-19
Payer: MEDICARE

## 2022-01-19 VITALS
HEART RATE: 80 BPM | WEIGHT: 130 LBS | HEIGHT: 67 IN | BODY MASS INDEX: 20.4 KG/M2 | DIASTOLIC BLOOD PRESSURE: 66 MMHG | SYSTOLIC BLOOD PRESSURE: 96 MMHG

## 2022-01-19 DIAGNOSIS — M21.41 PES PLANUS OF BOTH FEET: ICD-10-CM

## 2022-01-19 DIAGNOSIS — M72.2 PLANTAR FASCIAL FIBROMATOSIS OF RIGHT FOOT: Primary | ICD-10-CM

## 2022-01-19 DIAGNOSIS — M21.42 PES PLANUS OF BOTH FEET: ICD-10-CM

## 2022-01-19 PROCEDURE — 73630 X-RAY EXAM OF FOOT: CPT

## 2022-01-19 PROCEDURE — 99203 OFFICE O/P NEW LOW 30 MIN: CPT | Performed by: PODIATRIST

## 2022-01-19 RX ORDER — ALENDRONATE SODIUM 70 MG/1
1 TABLET ORAL WEEKLY
COMMUNITY
Start: 2021-12-23

## 2022-01-19 NOTE — PROGRESS NOTES
PATIENT:  Axel Castaneda  1950       ASSESSMENT:     1  Plantar fascial fibromatosis of right foot     2  Pes planus of both feet  XR foot 3+ vw right             PLAN:  1  Patient was counseled and educated on the condition and the diagnosis  2  X-ray was obtained and personally reviewed  The radiological findings were discussed with the patient  3  The exam and symptoms are consistent with plantar fibroma  The diagnosis, treatment options and prognosis were discussed with the patient  4  Instructed supportive care, home exercise, and proper footwear/ arch support  Discussed possible injection depending on the progress  Possible MRI depending on the progress  5  Sent her for arch support  Subjective:       HPI  The patient presents with chief complaint of bump on right arch  She had it for at least about 5 years  Slow growth  No significant pain with walking  No redness or edema  She also noticed her arch is falling  She denied any injury  No associated numbness or paresthesia  No significant weakness  The following portions of the patient's history were reviewed and updated as appropriate: allergies, current medications, past family history, past medical history, past social history, past surgical history and problem list   All pertinent labs and images were reviewed  Past Medical History  History reviewed  No pertinent past medical history  Past Surgical History  History reviewed  No pertinent surgical history  Allergies:  Patient has no known allergies  Medications:  Current Outpatient Medications   Medication Sig Dispense Refill    alendronate (Fosamax) 70 mg tablet Take 1 tablet by mouth once a week       No current facility-administered medications for this visit         Social History:  Social History     Socioeconomic History    Marital status: /Civil Union     Spouse name: None    Number of children: None    Years of education: None    Highest education level: None   Occupational History    None   Tobacco Use    Smoking status: Never Smoker    Smokeless tobacco: Never Used   Substance and Sexual Activity    Alcohol use: None    Drug use: None    Sexual activity: None   Other Topics Concern    None   Social History Narrative    None     Social Determinants of Health     Financial Resource Strain: Not on file   Food Insecurity: Not on file   Transportation Needs: Not on file   Physical Activity: Not on file   Stress: Not on file   Social Connections: Not on file   Intimate Partner Violence: Not on file   Housing Stability: Not on file          Review of Systems   Constitutional: Negative for appetite change, chills and fever  HENT: Negative for sore throat  Respiratory: Negative for cough and shortness of breath  Cardiovascular: Negative for chest pain and leg swelling  Gastrointestinal: Negative for diarrhea, nausea and vomiting  Musculoskeletal: Negative for gait problem  Skin: Negative for rash and wound  Allergic/Immunologic: Negative for immunocompromised state  Neurological: Negative for weakness and numbness  Hematological: Negative  Psychiatric/Behavioral: Negative for behavioral problems and confusion  Objective:      BP 96/66   Pulse 80   Ht 5' 7" (1 702 m) Comment: verbal  Wt 59 kg (130 lb)   BMI 20 36 kg/m²          Physical Exam  Vitals reviewed  Constitutional:       General: She is not in acute distress  Appearance: Normal appearance  She is not ill-appearing  HENT:      Head: Normocephalic and atraumatic  Cardiovascular:      Rate and Rhythm: Normal rate and regular rhythm  Pulses: Normal pulses  Dorsalis pedis pulses are 2+ on the right side and 2+ on the left side  Posterior tibial pulses are 2+ on the right side and 2+ on the left side  Pulmonary:      Effort: Pulmonary effort is normal  No respiratory distress     Musculoskeletal: General: Deformity present  No swelling or signs of injury  Cervical back: Normal range of motion and neck supple  Right lower leg: No edema  Left lower leg: No edema  Right foot: Normal range of motion  No foot drop  Left foot: Normal range of motion  No foot drop  Comments: Firm nodule on medial band of right plantar fascia  Flexible pes planus presents, right worse than left  Skin:     General: Skin is warm  Capillary Refill: Capillary refill takes less than 2 seconds  Coloration: Skin is not cyanotic or mottled  Findings: No abscess, ecchymosis, erythema, rash or wound  Nails: There is no clubbing  Neurological:      General: No focal deficit present  Mental Status: She is alert and oriented to person, place, and time  Cranial Nerves: No cranial nerve deficit  Sensory: No sensory deficit  Motor: No weakness  Coordination: Coordination normal    Psychiatric:         Mood and Affect: Mood normal          Behavior: Behavior normal          Thought Content:  Thought content normal          Judgment: Judgment normal

## 2022-12-09 ENCOUNTER — HOSPITAL ENCOUNTER (OUTPATIENT)
Dept: MAMMOGRAPHY | Facility: CLINIC | Age: 72
Discharge: HOME | End: 2022-12-09

## 2022-12-09 VITALS — BODY MASS INDEX: 20.42 KG/M2 | WEIGHT: 130.07 LBS | HEIGHT: 67 IN

## 2022-12-09 DIAGNOSIS — Z12.31 ENCOUNTER FOR SCREENING MAMMOGRAM FOR MALIGNANT NEOPLASM OF BREAST: ICD-10-CM

## 2022-12-13 ENCOUNTER — APPOINTMENT (OUTPATIENT)
Dept: LAB | Facility: HOSPITAL | Age: 72
End: 2022-12-13

## 2022-12-13 DIAGNOSIS — E78.00 PURE HYPERCHOLESTEROLEMIA: ICD-10-CM

## 2022-12-13 DIAGNOSIS — I51.9 MYXEDEMA HEART DISEASE: ICD-10-CM

## 2022-12-13 DIAGNOSIS — E03.9 MYXEDEMA HEART DISEASE: ICD-10-CM

## 2022-12-13 DIAGNOSIS — E55.9 AVITAMINOSIS D: ICD-10-CM

## 2022-12-13 DIAGNOSIS — I10 ESSENTIAL HYPERTENSION, MALIGNANT: ICD-10-CM

## 2022-12-13 LAB
25(OH)D3 SERPL-MCNC: 22.7 NG/ML (ref 30–100)
ALBUMIN SERPL BCP-MCNC: 3.9 G/DL (ref 3.5–5)
ALP SERPL-CCNC: 71 U/L (ref 46–116)
ALT SERPL W P-5'-P-CCNC: 20 U/L (ref 12–78)
ANION GAP SERPL CALCULATED.3IONS-SCNC: 4 MMOL/L (ref 4–13)
AST SERPL W P-5'-P-CCNC: 14 U/L (ref 5–45)
BASOPHILS # BLD AUTO: 0.03 THOUSANDS/ÂΜL (ref 0–0.1)
BASOPHILS NFR BLD AUTO: 1 % (ref 0–1)
BILIRUB SERPL-MCNC: 0.54 MG/DL (ref 0.2–1)
BUN SERPL-MCNC: 10 MG/DL (ref 5–25)
CALCIUM SERPL-MCNC: 9.2 MG/DL (ref 8.3–10.1)
CHLORIDE SERPL-SCNC: 105 MMOL/L (ref 96–108)
CHOLEST SERPL-MCNC: 197 MG/DL
CO2 SERPL-SCNC: 28 MMOL/L (ref 21–32)
CREAT SERPL-MCNC: 0.58 MG/DL (ref 0.6–1.3)
EOSINOPHIL # BLD AUTO: 0.05 THOUSAND/ÂΜL (ref 0–0.61)
EOSINOPHIL NFR BLD AUTO: 1 % (ref 0–6)
ERYTHROCYTE [DISTWIDTH] IN BLOOD BY AUTOMATED COUNT: 12.7 % (ref 11.6–15.1)
GFR SERPL CREATININE-BSD FRML MDRD: 92 ML/MIN/1.73SQ M
GLUCOSE P FAST SERPL-MCNC: 93 MG/DL (ref 65–99)
HCT VFR BLD AUTO: 40.3 % (ref 34.8–46.1)
HDLC SERPL-MCNC: 99 MG/DL
HGB BLD-MCNC: 12.8 G/DL (ref 11.5–15.4)
IMM GRANULOCYTES # BLD AUTO: 0.01 THOUSAND/UL (ref 0–0.2)
IMM GRANULOCYTES NFR BLD AUTO: 0 % (ref 0–2)
LDLC SERPL CALC-MCNC: 82 MG/DL (ref 0–100)
LYMPHOCYTES # BLD AUTO: 2.09 THOUSANDS/ÂΜL (ref 0.6–4.47)
LYMPHOCYTES NFR BLD AUTO: 50 % (ref 14–44)
MCH RBC QN AUTO: 30.8 PG (ref 26.8–34.3)
MCHC RBC AUTO-ENTMCNC: 31.8 G/DL (ref 31.4–37.4)
MCV RBC AUTO: 97 FL (ref 82–98)
MONOCYTES # BLD AUTO: 0.44 THOUSAND/ÂΜL (ref 0.17–1.22)
MONOCYTES NFR BLD AUTO: 10 % (ref 4–12)
NEUTROPHILS # BLD AUTO: 1.61 THOUSANDS/ÂΜL (ref 1.85–7.62)
NEUTS SEG NFR BLD AUTO: 38 % (ref 43–75)
NONHDLC SERPL-MCNC: 98 MG/DL
NRBC BLD AUTO-RTO: 0 /100 WBCS
PLATELET # BLD AUTO: 294 THOUSANDS/UL (ref 149–390)
PMV BLD AUTO: 10.4 FL (ref 8.9–12.7)
POTASSIUM SERPL-SCNC: 3.7 MMOL/L (ref 3.5–5.3)
PROT SERPL-MCNC: 6.8 G/DL (ref 6.4–8.4)
RBC # BLD AUTO: 4.16 MILLION/UL (ref 3.81–5.12)
SODIUM SERPL-SCNC: 137 MMOL/L (ref 135–147)
T4 FREE SERPL-MCNC: 1.03 NG/DL (ref 0.76–1.46)
TRIGL SERPL-MCNC: 79 MG/DL
TSH SERPL DL<=0.05 MIU/L-ACNC: 0.92 UIU/ML (ref 0.45–4.5)
VIT B12 SERPL-MCNC: 236 PG/ML (ref 100–900)
WBC # BLD AUTO: 4.23 THOUSAND/UL (ref 4.31–10.16)

## 2024-05-30 ENCOUNTER — VBI (OUTPATIENT)
Dept: ADMINISTRATIVE | Facility: OTHER | Age: 74
End: 2024-05-30

## 2024-08-26 ENCOUNTER — VBI (OUTPATIENT)
Dept: ADMINISTRATIVE | Facility: OTHER | Age: 74
End: 2024-08-26

## 2024-08-26 NOTE — TELEPHONE ENCOUNTER
08/26/24 9:37 AM     Chart reviewed for CRC: Colonoscopy ; nothing is submitted to the patient's insurance at this time.     Fantasma Garcia MA   [facility]

## 2024-10-06 ENCOUNTER — APPOINTMENT (EMERGENCY)
Dept: RADIOLOGY | Facility: HOSPITAL | Age: 74
DRG: 543 | End: 2024-10-06
Payer: COMMERCIAL

## 2024-10-06 ENCOUNTER — APPOINTMENT (INPATIENT)
Dept: RADIOLOGY | Facility: HOSPITAL | Age: 74
DRG: 543 | End: 2024-10-06
Payer: COMMERCIAL

## 2024-10-06 ENCOUNTER — TELEPHONE (OUTPATIENT)
Dept: RADIOLOGY | Facility: HOSPITAL | Age: 74
End: 2024-10-06

## 2024-10-06 ENCOUNTER — HOSPITAL ENCOUNTER (INPATIENT)
Facility: HOSPITAL | Age: 74
LOS: 4 days | Discharge: NURSING, REHAB OR LONG-TERM CARE | DRG: 543 | End: 2024-10-10
Attending: EMERGENCY MEDICINE | Admitting: SURGERY
Payer: COMMERCIAL

## 2024-10-06 DIAGNOSIS — S32.810A MULTIPLE CLOSED FRACTURES OF PELVIS WITH STABLE DISRUPTION OF PELVIC RING, INITIAL ENCOUNTER (HCC): ICD-10-CM
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
## 2024-10-06 DIAGNOSIS — S72.009A HIP FRACTURE (HCC): ICD-10-CM
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
## 2024-10-06 DIAGNOSIS — M25.551 RIGHT HIP PAIN: ICD-10-CM

## 2024-10-06 PROBLEM — S32.9XXA CLOSED FRACTURE OF PELVIS (HCC): Status: ACTIVE | Noted: 2024-10-06

## 2024-10-06 LAB
25(OH)D3 SERPL-MCNC: 23.3 NG/ML (ref 30–100)
ALBUMIN SERPL BCG-MCNC: 4 G/DL (ref 3.5–5)
ALP SERPL-CCNC: 61 U/L (ref 34–104)
ALT SERPL W P-5'-P-CCNC: 20 U/L (ref 7–52)
ANION GAP SERPL CALCULATED.3IONS-SCNC: 8 MMOL/L (ref 4–13)
AST SERPL W P-5'-P-CCNC: 21 U/L (ref 13–39)
BILIRUB SERPL-MCNC: 0.83 MG/DL (ref 0.2–1)
BUN SERPL-MCNC: 8 MG/DL (ref 5–25)
CALCIUM SERPL-MCNC: 8.9 MG/DL (ref 8.4–10.2)
CHLORIDE SERPL-SCNC: 103 MMOL/L (ref 96–108)
CO2 SERPL-SCNC: 26 MMOL/L (ref 21–32)
CREAT SERPL-MCNC: 0.49 MG/DL (ref 0.6–1.3)
ERYTHROCYTE [SEDIMENTATION RATE] IN BLOOD: 2 MM/HOUR (ref 0–29)
GFR SERPL CREATININE-BSD FRML MDRD: 96 ML/MIN/1.73SQ M
GLUCOSE SERPL-MCNC: 117 MG/DL (ref 65–140)
PLATELET # BLD AUTO: 187 THOUSANDS/UL (ref 149–390)
PMV BLD AUTO: 9.9 FL (ref 8.9–12.7)
POTASSIUM SERPL-SCNC: 4.1 MMOL/L (ref 3.5–5.3)
PROT SERPL-MCNC: 6.3 G/DL (ref 6.4–8.4)
PTH-INTACT SERPL-MCNC: 43.2 PG/ML (ref 12–88)
SODIUM SERPL-SCNC: 137 MMOL/L (ref 135–147)
TSH SERPL DL<=0.05 MIU/L-ACNC: 1.59 UIU/ML (ref 0.45–4.5)

## 2024-10-06 PROCEDURE — 82306 VITAMIN D 25 HYDROXY: CPT

## 2024-10-06 PROCEDURE — 83970 ASSAY OF PARATHORMONE: CPT

## 2024-10-06 PROCEDURE — 99284 EMERGENCY DEPT VISIT MOD MDM: CPT

## 2024-10-06 PROCEDURE — 72131 CT LUMBAR SPINE W/O DYE: CPT

## 2024-10-06 PROCEDURE — 80053 COMPREHEN METABOLIC PANEL: CPT

## 2024-10-06 PROCEDURE — 72192 CT PELVIS W/O DYE: CPT

## 2024-10-06 PROCEDURE — 99285 EMERGENCY DEPT VISIT HI MDM: CPT | Performed by: EMERGENCY MEDICINE

## 2024-10-06 PROCEDURE — 96374 THER/PROPH/DIAG INJ IV PUSH: CPT

## 2024-10-06 PROCEDURE — 84443 ASSAY THYROID STIM HORMONE: CPT

## 2024-10-06 PROCEDURE — 85652 RBC SED RATE AUTOMATED: CPT

## 2024-10-06 PROCEDURE — 85049 AUTOMATED PLATELET COUNT: CPT | Performed by: NURSE PRACTITIONER

## 2024-10-06 RX ORDER — ENOXAPARIN SODIUM 100 MG/ML
30 INJECTION SUBCUTANEOUS EVERY 12 HOURS
Status: DISCONTINUED | OUTPATIENT
Start: 2024-10-06 | End: 2024-10-10 | Stop reason: HOSPADM

## 2024-10-06 RX ORDER — HYDROMORPHONE HCL/PF 1 MG/ML
0.5 SYRINGE (ML) INJECTION ONCE
Status: COMPLETED | OUTPATIENT
Start: 2024-10-06 | End: 2024-10-06

## 2024-10-06 RX ORDER — ACETAMINOPHEN 325 MG/1
975 TABLET ORAL EVERY 8 HOURS SCHEDULED
Status: DISCONTINUED | OUTPATIENT
Start: 2024-10-06 | End: 2024-10-10 | Stop reason: HOSPADM

## 2024-10-06 RX ORDER — FENTANYL CITRATE 50 UG/ML
1 INJECTION, SOLUTION INTRAMUSCULAR; INTRAVENOUS ONCE
Status: COMPLETED | OUTPATIENT
Start: 2024-10-06 | End: 2024-10-06

## 2024-10-06 RX ORDER — OXYCODONE HYDROCHLORIDE 5 MG/1
5 TABLET ORAL EVERY 4 HOURS PRN
Status: DISCONTINUED | OUTPATIENT
Start: 2024-10-06 | End: 2024-10-10 | Stop reason: HOSPADM

## 2024-10-06 RX ORDER — HYDROMORPHONE HCL IN WATER/PF 6 MG/30 ML
0.2 PATIENT CONTROLLED ANALGESIA SYRINGE INTRAVENOUS
Status: DISCONTINUED | OUTPATIENT
Start: 2024-10-06 | End: 2024-10-07

## 2024-10-06 RX ORDER — DOCUSATE SODIUM 100 MG/1
100 CAPSULE, LIQUID FILLED ORAL 2 TIMES DAILY
Status: DISCONTINUED | OUTPATIENT
Start: 2024-10-06 | End: 2024-10-10 | Stop reason: HOSPADM

## 2024-10-06 RX ORDER — METHOCARBAMOL 500 MG/1
500 TABLET, FILM COATED ORAL EVERY 8 HOURS SCHEDULED
Status: DISCONTINUED | OUTPATIENT
Start: 2024-10-06 | End: 2024-10-08

## 2024-10-06 RX ORDER — SENNOSIDES 8.6 MG
2 TABLET ORAL
Status: DISCONTINUED | OUTPATIENT
Start: 2024-10-06 | End: 2024-10-10 | Stop reason: HOSPADM

## 2024-10-06 RX ADMIN — METHOCARBAMOL 500 MG: 500 TABLET ORAL at 21:32

## 2024-10-06 RX ADMIN — DOCUSATE SODIUM 100 MG: 100 CAPSULE, LIQUID FILLED ORAL at 14:32

## 2024-10-06 RX ADMIN — OXYCODONE HYDROCHLORIDE 5 MG: 5 TABLET ORAL at 17:27

## 2024-10-06 RX ADMIN — HYDROMORPHONE HYDROCHLORIDE 0.5 MG: 1 INJECTION, SOLUTION INTRAMUSCULAR; INTRAVENOUS; SUBCUTANEOUS at 11:28

## 2024-10-06 RX ADMIN — ACETAMINOPHEN 975 MG: 325 TABLET ORAL at 14:32

## 2024-10-06 RX ADMIN — SENNOSIDES 17.2 MG: 8.6 TABLET, FILM COATED ORAL at 21:32

## 2024-10-06 RX ADMIN — HYDROMORPHONE HYDROCHLORIDE 0.2 MG: 0.2 INJECTION, SOLUTION INTRAMUSCULAR; INTRAVENOUS; SUBCUTANEOUS at 15:21

## 2024-10-06 RX ADMIN — ENOXAPARIN SODIUM 30 MG: 30 INJECTION SUBCUTANEOUS at 17:26

## 2024-10-06 RX ADMIN — METHOCARBAMOL 500 MG: 500 TABLET ORAL at 14:32

## 2024-10-06 RX ADMIN — ACETAMINOPHEN 975 MG: 325 TABLET ORAL at 21:31

## 2024-10-06 NOTE — H&P
H&P - Trauma   Name: Catherine Lemon 74 y.o. female I MRN: [id number]  Unit/Bed#: ED 05 I Date of Admission: 10/6/2024   Date of Service: 10/6/2024 I Hospital Day: 0     Assessment & Plan  Fall, initial encounter  Fall while walking her small dog  PAin in RLE and pelvis with ambulation and when lying still  Unable to get up so came to ER today  Right hip pain  Scans completed  Sup and inf pubic rami fractures as well as Sacral ala  Ortho consulted and seeing patient  NWB at this time  Neurovascular checks  PT/OT  DVT prophylaxis  Hip fracture (HCC)  See above    Trauma Alert: evaluation from ER   Model of Arrival: Ambulance    Trauma Team: Attending Nate and YOLI Tran  Consultants: Orthopedics consulted and on their way to see patient     History of Present Illness   Chief Complaint: pelvis and RLE pain  Mechanism:Fall     Catherine Lemon is a 74 y.o. female who presents after a fall yesterday while walking her dog.  Managed to get home and on the sofa and then no more moving.  Came to ER today secondary to pain and being unable to move the RLE.  Neurovascular intact, takes no meds except Ibuprofen for pain.  Lives with  who is dependent on her for care.  Will contact Care management to assist.    Review of Systems   Constitutional:  Positive for activity change.   HENT: Negative.     Eyes: Negative.    Respiratory: Negative.  Negative for wheezing.    Gastrointestinal: Negative.    Endocrine: Negative.    Genitourinary: Negative.    Musculoskeletal: Negative.    Skin: Negative.    Allergic/Immunologic: Negative.    Neurological: Negative.    Hematological: Negative.    Psychiatric/Behavioral: Negative.       I have reviewed the patient's PMH, PSH, Social History, Family History, Meds, and Allergies  Immunization History   Administered Date(s) Administered    COVID-19 PFIZER VACCINE 0.3 ML IM 03/19/2021, 04/09/2021, 10/12/2021     Last Tetanus: unkown    1. Before the illness or injury that brought you to  the Emergency, did you need someone to help you on a regular basis? 0=No   2. Since the illness or injury that brought you to the Emergency, have you needed more help than usual to take care of yourself? 0=No   3. Have you been hospitalized for one or more nights during the past 6 months (excluding a stay in the Emergency Department)? 0=No   4. In general, do you see well? 0=Yes   5. In general, do you have serious problems with your memory? 0=No   6. Do you take more than three different medications everyday? 0=No   TOTAL   0     Did you order a geriatric consult if the score was 2 or greater?: no       Objective :  Temp:  [98 °F (36.7 °C)] 98 °F (36.7 °C)  HR:  [75-85] 77  BP: (126-175)/(67-93) 126/70  Resp:  [16-20] 16  SpO2:  [98 %-100 %] 98 %  O2 Device: None (Room air)    Initial Vitals:   Temperature: 98 °F (36.7 °C) (10/06/24 1115)  Pulse: 85 (10/06/24 1110)  Respirations: 20 (10/06/24 1110)  Blood Pressure: (!) 175/93 (10/06/24 1110)    Primary Survey:   Airway:        Status: patent;        Pre-hospital Interventions: none        Hospital Interventions: none  Breathing:                      Right breath sounds: normal       Left breath sounds: normal  Circulation:        Rhythm: regular          Right Pulses Left Pulses    R radial: 2+    R pedal: 2+     L radial: 2+  L femoral: 2+  L pedal: 2+       Disability:        GCS:        Right Pupil: round;  reactive         Left Pupil:  reactive      R Motor Strength L Motor Strength    R : 5/5  R dorsiflex: 5/5  R plantarflex: 5/5 L : 5/5  L dorsiflex: 5/5  L plantarflex: 5/5        Sensory:  No sensory deficit  Exposure:           Secondary Survey:  Physical Exam  Constitutional:       Appearance: Normal appearance.   HENT:      Head: Normocephalic and atraumatic.      Right Ear: External ear normal.      Left Ear: External ear normal.      Nose: Nose normal.      Mouth/Throat:      Mouth: Mucous membranes are moist.      Pharynx: Oropharynx is clear.  "  Eyes:      Extraocular Movements: Extraocular movements intact.      Pupils: Pupils are equal, round, and reactive to light.   Cardiovascular:      Rate and Rhythm: Normal rate and regular rhythm.      Heart sounds: Normal heart sounds.   Pulmonary:      Effort: Pulmonary effort is normal. No respiratory distress.      Breath sounds: Normal breath sounds.   Chest:      Chest wall: Tenderness present.   Abdominal:      General: Bowel sounds are normal. There is no distension.      Palpations: There is no mass.      Tenderness: There is no abdominal tenderness.   Genitourinary:     Comments: deferred  Musculoskeletal:         General: Tenderness and signs of injury present. No swelling.   Skin:     General: Skin is warm and dry.   Neurological:      Mental Status: She is oriented to person, place, and time.   Psychiatric:         Mood and Affect: Mood normal.         Behavior: Behavior normal.              Lab Results: I have reviewed the following results:  No results for input(s): \"WBC\", \"HGB\", \"HCT\", \"PLT\", \"BANDSPCT\", \"SODIUM\", \"K\", \"CL\", \"CO2\", \"BUN\", \"CREATININE\", \"GLUC\", \"CAIONIZED\", \"MG\", \"PHOS\", \"AST\", \"ALT\", \"ALB\", \"TBILI\", \"DBILI\", \"ALKPHOS\", \"PTT\", \"INR\", \"HSTNI0\", \"HSTNI2\", \"BNP\", \"LACTICACID\" in the last 72 hours.    Imaging Results: I have personally reviewed pertinent images saved in PACS. CT scan findings (and other pertinent positive findings on images) were discussed with radiology. My interpretation of the images/reports are as follows:  Chest Xray(s):    FAST exam(s): N/A   CT Scan(s): CT spine - No acute compression collapse of the vertebra  Fracture of the right sacral ala with mild widening of the right sacroiliac joint      Additional Xray(s):      Other Studies: CT Pelvis:  Nondisplaced crush fracture of the right sacrum. Nondisplaced fractures of the right inferior and superior pubic rami. 2 superior pubic rami fractures are noted one adjacent to the pubic symphysis and a second anterior " to the right acetabulum. FFP type   IIb fracture.  Osteoporosis.  Posterior disc extrusion at L5-S1 that extends into the left neural foramen with marked left-sided neural foraminal narrowing.

## 2024-10-06 NOTE — CONSULTS
"Consultation - Orthopedics   Name: Catherine Lemon 74 y.o. female I MRN: [id number]  Unit/Bed#: ED 05 I Date of Admission: 10/6/2024   Date of Service: 10/6/2024 I Hospital Day: 0   Inpatient consult to Orthopedic Surgery  Consult performed by: Keyur Muro MD  Consult ordered by: ANASTASIA Zamudio    Physician Requesting Evaluation: Criselda Sullivan DO   Reason for Evaluation / Principal Problem: Pelvic Fractures    Assessment & Plan  Closed fracture of pelvis (HCC)  74 y.o. female S/P fall without head strike on Saturday (10/5) with right LC1 pelvic fracture stable on physical exam, indicated for non-operative management.    Plan:   Weight bearing as tolerated BLLE  XR of pelvis  Analgesics for pain  Metabolic bone/endocrine consult/labs placed  DVT ppx ASA 81 mg BID unless medically contraindicated   Diet ok from ortho standpoint  PT/OT  Dispo: Ortho will follow  Please contact the SecureChat role,\"[facility] Ortho Role\", with any questions/concerns.    History of Present Illness   HPI: 74 y.o. female community ambulator status post ground level fall on Saturday (10/5) complaining or right sided gluteal pain. No headstrike, no LOC, not on blood thinners. Pain is sharp in character, located \"in my backside,\" acute in onset, constant in duration, severe in intensity. Exacerbating factors movement and bearing weight, remitting factors rest. Doesn't radiate, no numbness, no tingling, no open wounds noted. No other complaints at this time. Reports know past medical history. Occupation retired.       Review of Systems   Constitutional: Negative.    HENT: Negative.     Eyes: Negative.    Respiratory: Negative.     Cardiovascular: Negative.    Gastrointestinal: Negative.    Endocrine: Negative.    Genitourinary: Negative.    Musculoskeletal:  Positive for gait problem.        Pain with ambulation   Skin:  Positive for wound.        Superficial abrasion to the right elbow.   Hematological: Negative.    Psychiatric/Behavioral: " "Negative.       I have reviewed the patient's PMH, PSH, Social History, Family History, Meds, and Allergies    Past Surgical History:   History reviewed. No pertinent surgical history.    Objective :  Temp:  [98 °F (36.7 °C)] 98 °F (36.7 °C)  HR:  [75-85] 77  BP: (126-175)/(67-93) 126/70  Resp:  [16-20] 16  SpO2:  [98 %-100 %] 98 %  O2 Device: None (Room air)  Physical ExamOrtho Exam     Physical Exam:   Musculoskeletal: bilateral lower extremity  Skin intact  Tender to palpation over anterior and posterior pelvis  Leg lengths equal  ROM not assessed 2/2 known fracture  SILT sp/dp/s/s/t  Positive knee flexion/extension, ankle dorsi/plantar flexion, EHL/FHL. Pt guarding due to pain  2+ DP/PT pulse  Musculature is soft and compressible, no pain with passive stretch    Tertiary exam was negative for additional palpable stepoffs or additional bony tenderness to palpation      Lab Results: I have reviewed the following results:   No results for input(s): \"WBC\", \"HGB\", \"HCT\", \"PLT\", \"BANDSPCT\", \"BUN\", \"CREATININE\", \"PTT\", \"INR\", \"ESR\", \"CRP\" in the last 72 hours.  Blood Culture: No results found for: \"BLOODCX\"  Wound Culture: No results found for: \"WOUNDCULT\"    Imaging Results Review: I personally reviewed the following image studies/reports in PACS and discussed pertinent findings with Radiology: CT Pelvis. My interpretation of the radiology images/reports is: CT of pelvis demonstrate minimally displaced right sacral ala fracture and right inferior and superior pubic rami fractures consistent with LC1 type sacral fracture.      This consult was completed with the senior resident and attending on call.   "

## 2024-10-06 NOTE — PLAN OF CARE
Problem: Potential for Falls  Goal: Patient will remain free of falls  Description: INTERVENTIONS:  - Educate patient/family on patient safety including physical limitations  - Instruct patient to call for assistance with activity   - Consult OT/PT to assist with strengthening/mobility   - Keep Call bell within reach  - Keep bed low and locked with side rails adjusted as appropriate  - Keep care items and personal belongings within reach  - Initiate and maintain comfort rounds  - Make Fall Risk Sign visible to staff  - Offer Toileting every 2 Hours, in advance of need  - Initiate/Maintain bed alarm  - Obtain necessary fall risk management equipment:   - Apply yellow socks and bracelet for high fall risk patients  - Consider moving patient to room near nurses station  Outcome: Progressing     Problem: PAIN - ADULT  Goal: Verbalizes/displays adequate comfort level or baseline comfort level  Description: Interventions:  - Encourage patient to monitor pain and request assistance  - Assess pain using appropriate pain scale  - Administer analgesics based on type and severity of pain and evaluate response  - Implement non-pharmacological measures as appropriate and evaluate response  - Consider cultural and social influences on pain and pain management  - Notify physician/advanced practitioner if interventions unsuccessful or patient reports new pain  Outcome: Progressing     Problem: DISCHARGE PLANNING  Goal: Discharge to home or other facility with appropriate resources  Description: INTERVENTIONS:  - Identify barriers to discharge w/patient and caregiver  - Arrange for needed discharge resources and transportation as appropriate  - Identify discharge learning needs (meds, wound care, etc.)  - Arrange for interpretive services to assist at discharge as needed  - Refer to Case Management Department for coordinating discharge planning if the patient needs post-hospital services based on physician/advanced practitioner  order or complex needs related to functional status, cognitive ability, or social support system  Outcome: Progressing     Problem: Knowledge Deficit  Goal: Patient/family/caregiver demonstrates understanding of disease process, treatment plan, medications, and discharge instructions  Description: Complete learning assessment and assess knowledge base.  Interventions:  - Provide teaching at level of understanding  - Provide teaching via preferred learning methods  Outcome: Progressing     Problem: NEUROSENSORY - ADULT  Goal: Achieves stable or improved neurological status  Description: INTERVENTIONS  - Monitor and report changes in neurological status  - Monitor vital signs such as temperature, blood pressure, glucose, and any other labs ordered   - Initiate measures to prevent increased intracranial pressure  - Monitor for seizure activity and implement precautions if appropriate      Outcome: Progressing  Goal: Remains free of injury related to seizures activity  Description: INTERVENTIONS  - Maintain airway, patient safety  and administer oxygen as ordered  - Monitor patient for seizure activity, document and report duration and description of seizure to physician/advanced practitioner  - If seizure occurs,  ensure patient safety during seizure  - Reorient patient post seizure  - Seizure pads on all 4 side rails  - Instruct patient/family to notify RN of any seizure activity including if an aura is experienced  - Instruct patient/family to call for assistance with activity based on nursing assessment  - Administer anti-seizure medications if ordered    Outcome: Progressing  Goal: Achieves maximal functionality and self care  Description: INTERVENTIONS  - Monitor swallowing and airway patency with patient fatigue and changes in neurological status  - Encourage and assist patient to increase activity and self care.   - Encourage visually impaired, hearing impaired and aphasic patients to use assistive/communication  devices  Outcome: Progressing     Problem: MUSCULOSKELETAL - ADULT  Goal: Maintain or return mobility to safest level of function  Description: INTERVENTIONS:  - Assess patient's ability to carry out ADLs; assess patient's baseline for ADL function and identify physical deficits which impact ability to perform ADLs (bathing, care of mouth/teeth, toileting, grooming, dressing, etc.)  - Assess/evaluate cause of self-care deficits   - Assess range of motion  - Assess patient's mobility  - Assess patient's need for assistive devices and provide as appropriate  - Encourage maximum independence but intervene and supervise when necessary  - Involve family in performance of ADLs  - Assess for home care needs following discharge   - Consider OT consult to assist with ADL evaluation and planning for discharge  - Provide patient education as appropriate  Outcome: Progressing  Goal: Maintain proper alignment of affected body part  Description: INTERVENTIONS:  - Support, maintain and protect limb and body alignment  - Provide patient/ family with appropriate education  Outcome: Progressing     Problem: Nutrition/Hydration-ADULT  Goal: Nutrient/Hydration intake appropriate for improving, restoring or maintaining nutritional needs  Description: Monitor and assess patient's nutrition/hydration status for malnutrition. Collaborate with interdisciplinary team and initiate plan and interventions as ordered.  Monitor patient's weight and dietary intake as ordered or per policy. Utilize nutrition screening tool and intervene as necessary. Determine patient's food preferences and provide high-protein, high-caloric foods as appropriate.     INTERVENTIONS:  - Monitor oral intake, urinary output, labs, and treatment plans  - Assess nutrition and hydration status and recommend course of action  - Evaluate amount of meals eaten  - Assist patient with eating if necessary   - Allow adequate time for meals  - Recommend/ encourage appropriate  diets, oral nutritional supplements, and vitamin/mineral supplements  - Order, calculate, and assess calorie counts as needed  - Recommend, monitor, and adjust tube feedings and TPN/PPN based on assessed needs  - Assess need for intravenous fluids  - Provide specific nutrition/hydration education as appropriate  - Include patient/family/caregiver in decisions related to nutrition  Outcome: Progressing     Problem: Prexisting or High Potential for Compromised Skin Integrity  Goal: Skin integrity is maintained or improved  Description: INTERVENTIONS:  - Identify patients at risk for skin breakdown  - Assess and monitor skin integrity  - Assess and monitor nutrition and hydration status  - Monitor labs   - Assess for incontinence   - Turn and reposition patient  - Assist with mobility/ambulation  - Relieve pressure over bony prominences  - Avoid friction and shearing  - Provide appropriate hygiene as needed including keeping skin clean and dry  - Evaluate need for skin moisturizer/barrier cream  - Collaborate with interdisciplinary team   - Patient/family teaching  - Consider wound care consult   Outcome: Progressing

## 2024-10-06 NOTE — CASE MANAGEMENT
Case Management Assessment & Discharge Planning Note    Patient name Catherine Lemon  Location ED 05/ED 05 MRN [id number]  : 1950 Date 10/6/2024       Current Admission Date: 10/6/2024  Current Admission Diagnosis:Closed fracture of pelvis (HCC)  Patient Active Problem List    Diagnosis Date Noted Date Diagnosed    Closed fracture of pelvis (HCC) 10/06/2024       LOS (days): 0  Geometric Mean LOS (GMLOS) (days):   Days to GMLOS:     OBJECTIVE:    Risk of Unplanned Readmission Score: 6.51         Current admission status: Inpatient  Referral Reason: Other    Preferred Pharmacy:   [id number] - [place], [place] - [address]  [address]  [address]  [facility] [address]  Phone: [phone] Fax: [phone]    Primary Care Provider: Douglas Charles Shoenberger, MD    Primary Insurance: [employer]  Secondary Insurance:     ASSESSMENT:  Active Health Care Proxies    There are no active Health Care Proxies on file.                 Readmission Root Cause  30 Day Readmission: No    Patient Information  Admitted from:: Home  Mental Status: Alert  During Assessment patient was accompanied by: Other-Comment  Assessment information provided by:: Patient  Primary Caregiver: Self  Support Systems: Spouse/significant other  County of Residence: [place]  What city do you live in?: [place]    Activities of Daily Living Prior to Admission  Functional Status: Independent  Completes ADLs independently?: Yes  Ambulates independently?: Yes  Does patient use assisted devices?: No  Does patient currently own DME?: No                   Means of Transportation  Means of Transport to Appts:: Drives Self      Social Determinants of Health (SDOH)      Flowsheet Row Most Recent Value   Housing Stability    In the last 12 months, was there a time when you were not able to pay the mortgage or rent on time? N   In the past 12 months, how many times have you moved where you were living? 0   At any time in the  past 12 months, were you homeless or living in a shelter (including now)? N   Transportation Needs    In the past 12 months, has lack of transportation kept you from medical appointments or from getting medications? no   In the past 12 months, has lack of transportation kept you from meetings, work, or from getting things needed for daily living? No   Food Insecurity    Within the past 12 months, you worried that your food would run out before you got the money to buy more. Never true   Within the past 12 months, the food you bought just didn't last and you didn't have money to get more. Never true   Utilities    In the past 12 months has the electric, gas, oil, or water company threatened to shut off services in your home? No            DISCHARGE DETAILS:    Discharge planning discussed with:: Patient  Freedom of Choice: Yes     CM contacted family/caregiver?: Yes  Were Treatment Team discharge recommendations reviewed with patient/caregiver?: Yes  Did patient/caregiver verbalize understanding of patient care needs?: Yes        Additional Comments: CM met with pt at bedside due to concerns about pt being a cargiver for her  who is currently at home.  Pt states she is the primary care taker for her  and she does not have anyone who can be with him.  Pt states that her  is safe at home and does not think that APS or police need to be called but is asking if the hospital has any services to go check on her .  Pt states her  is mobile and can care for the dog at home. She states that what she primairly wants is for someone to check in on her . CM explained that the hospital does not have any services like that.  CM stated that CM can provide her with a list of private pay home health agencies. Pt declined list at this time stating that she will make some phone calls.  She requested a follow up tomorrow from CM to reassess needs.  Pt states a family member will be helping her   tonight.

## 2024-10-06 NOTE — ED ATTENDING ATTESTATION
10/6/2024  I, Lois Priest MD, saw and evaluated the patient. I have discussed the patient with the resident/non-physician practitioner and agree with the resident's/non-physician practitioner's findings, Plan of Care, and MDM as documented in the resident's/non-physician practitioner's note, except where noted. All available labs and Radiology studies were reviewed.  I was present for key portions of any procedure(s) performed by the resident/non-physician practitioner and I was immediately available to provide assistance.       At this point I agree with the current assessment done in the Emergency Department.  I have conducted an independent evaluation of this patient a history and physical is as follows:  Patient got her ankles tangled in her dog leash yesterday and fell onto her bottom.  Patient states that initially she was able to walk with a walker, but now cannot bear weight.  Patient complains of severe right hip pain.  Did not strike her head.  Did not lose consciousness.  No abdominal pain or shortness of breath.  Patient's only other comorbid illness is osteoporosis.  Review of systems otherwise -12 systems reviewed.  On exam the patient has no signs of trauma to the head or neck.  Her heart is regular without murmurs, rubs, or gallops.  Her lungs are clear with good air movement.  Her abdomen is soft and nontender.  Her pelvis is stable to rock.  She has tenderness over her lumbosacral spine as well as over her posterior right ischium.  The patient has no chest wall tenderness.  She has a large ecchymosis to her right forearm, but normal range of motion at the elbow and shoulder.  Her exam is otherwise benign.MEDICAL DECISION MAKING    Number and Complexity of Problems  Differential diagnosis: Mechanical fall, pelvis fracture, hip fracture, back fracture, arm fracture    Medical Decision Making Data  External documents reviewed: Patient's prior medical history reviewed, patient is on  bisphosphonate  My EKG interpretation:   My CT interpretation: Right-sided pelvic fractures  My X-ray interpretation:   My ultrasound interpretation:     CT pelvis wo contrast   Final Result      Nondisplaced crush fracture of the right sacrum. Nondisplaced fractures of the right inferior and superior pubic rami. 2 superior pubic rami fractures are noted one adjacent to the pubic symphysis and a second anterior to the right acetabulum. FFP type    IIb fracture.      Osteoporosis.      Posterior disc extrusion at L5-S1 that extends into the left neural foramen with marked left-sided neural foraminal narrowing.      The study was marked in EPIC for immediate notification.            Workstation performed: [id number]         CT lumbar spine without contrast   Final Result   No acute compression collapse of the vertebra   Fracture of the right sacral ala with mild widening of the right sacroiliac joint      Workstation performed: [id number]         XR hip/pelv 2-3 vws right if performed    (Results Pending)       Labs Reviewed - No data to display    Labs reviewed by me are significant for:     Clinical decision rules/scores are significant for:     Discussed case with:   Considered admission for:     Treatment and Disposition  ED course: Patient seen and examined, given medication for pain, will CT for fracture.  Reassessment: Patient with pelvic fractures.  Will consult trauma  Shared decision making:   Code status:      ED Course         Critical Care Time  Procedures

## 2024-10-06 NOTE — ASSESSMENT & PLAN NOTE
74 y.o. female S/P fall without head strike on Saturday (10/5) with right LC1 pelvic fracture stable on physical exam, indicated for non-operative management.    Plan:   Weight bearing as tolerated BLLE  XR of pelvis  Analgesics for pain  Metabolic bone/endocrine consult/labs placed  DVT ppx ASA 81 mg BID unless medically contraindicated   Diet ok from ortho standpoint  PT/OT  Dispo: Ortho will follow

## 2024-10-06 NOTE — TELEPHONE ENCOUNTER
Patient requests that her xrays not been today , it has been a lot today and requests not to me moved

## 2024-10-07 ENCOUNTER — APPOINTMENT (INPATIENT)
Dept: RADIOLOGY | Facility: HOSPITAL | Age: 74
DRG: 543 | End: 2024-10-07
Payer: COMMERCIAL

## 2024-10-07 PROBLEM — R93.5 ABNORMAL CT SCAN, PELVIS: Status: ACTIVE | Noted: 2024-10-07

## 2024-10-07 PROBLEM — M80.00XA OSTEOPOROSIS WITH CURRENT PATHOLOGICAL FRACTURE: Chronic | Status: ACTIVE | Noted: 2024-10-07
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
## 2024-10-07 PROCEDURE — 97163 PT EVAL HIGH COMPLEX 45 MIN: CPT

## 2024-10-07 PROCEDURE — 99222 1ST HOSP IP/OBS MODERATE 55: CPT | Performed by: INTERNAL MEDICINE

## 2024-10-07 PROCEDURE — 97167 OT EVAL HIGH COMPLEX 60 MIN: CPT

## 2024-10-07 PROCEDURE — NC001 PR NO CHARGE: Performed by: ORTHOPAEDIC SURGERY

## 2024-10-07 PROCEDURE — 99222 1ST HOSP IP/OBS MODERATE 55: CPT | Performed by: NURSE PRACTITIONER

## 2024-10-07 PROCEDURE — 99222 1ST HOSP IP/OBS MODERATE 55: CPT | Performed by: ORTHOPAEDIC SURGERY

## 2024-10-07 PROCEDURE — 73502 X-RAY EXAM HIP UNI 2-3 VIEWS: CPT

## 2024-10-07 PROCEDURE — 99232 SBSQ HOSP IP/OBS MODERATE 35: CPT | Performed by: SURGERY

## 2024-10-07 RX ORDER — GABAPENTIN 100 MG/1
100 CAPSULE ORAL 3 TIMES DAILY
Status: DISCONTINUED | OUTPATIENT
Start: 2024-10-07 | End: 2024-10-08

## 2024-10-07 RX ORDER — HYDROMORPHONE HCL IN WATER/PF 6 MG/30 ML
0.2 PATIENT CONTROLLED ANALGESIA SYRINGE INTRAVENOUS EVERY 2 HOUR PRN
Status: DISCONTINUED | OUTPATIENT
Start: 2024-10-07 | End: 2024-10-10

## 2024-10-07 RX ORDER — ONDANSETRON 2 MG/ML
4 INJECTION INTRAMUSCULAR; INTRAVENOUS EVERY 4 HOURS PRN
Status: DISCONTINUED | OUTPATIENT
Start: 2024-10-07 | End: 2024-10-10 | Stop reason: HOSPADM

## 2024-10-07 RX ADMIN — ONDANSETRON 4 MG: 2 INJECTION INTRAMUSCULAR; INTRAVENOUS at 11:03

## 2024-10-07 RX ADMIN — GABAPENTIN 100 MG: 100 CAPSULE ORAL at 11:02

## 2024-10-07 RX ADMIN — ACETAMINOPHEN 975 MG: 325 TABLET ORAL at 13:04

## 2024-10-07 RX ADMIN — DOCUSATE SODIUM 100 MG: 100 CAPSULE, LIQUID FILLED ORAL at 16:03

## 2024-10-07 RX ADMIN — METHOCARBAMOL 500 MG: 500 TABLET ORAL at 06:03

## 2024-10-07 RX ADMIN — GABAPENTIN 100 MG: 100 CAPSULE ORAL at 16:02

## 2024-10-07 RX ADMIN — ACETAMINOPHEN 975 MG: 325 TABLET ORAL at 06:03

## 2024-10-07 RX ADMIN — DOCUSATE SODIUM 100 MG: 100 CAPSULE, LIQUID FILLED ORAL at 08:36

## 2024-10-07 RX ADMIN — ENOXAPARIN SODIUM 30 MG: 30 INJECTION SUBCUTANEOUS at 17:27

## 2024-10-07 RX ADMIN — OXYCODONE HYDROCHLORIDE 5 MG: 5 TABLET ORAL at 11:03

## 2024-10-07 RX ADMIN — OXYCODONE HYDROCHLORIDE 5 MG: 5 TABLET ORAL at 16:03

## 2024-10-07 RX ADMIN — METHOCARBAMOL 500 MG: 500 TABLET ORAL at 13:04

## 2024-10-07 RX ADMIN — ENOXAPARIN SODIUM 30 MG: 30 INJECTION SUBCUTANEOUS at 06:04

## 2024-10-07 NOTE — CONSULTS
Consultation - Endocrinology   Name: Catherine Lemon 74 y.o. female I MRN: [id number]  Unit/Bed#: [facility] 619-01 I Date of Admission: 10/6/2024   Date of Service: 10/7/2024 I Hospital Day: 1  Inpatient consult to Endocrinology  Consult performed by: Arabella Farris MD  Consult ordered by: Keyur Muro MD        Physician Requesting Evaluation: Criselda Sullivan DO   Reason for Evaluation / Principal Problem: Own the Bone    Assessment & Plan  Osteoporosis with current pathological fracture  - concerning with severe  - previous DXA scan showed -2.5 T-score of Left femoral neck. She was unable to take Fosamax  - discussed pathophysiology of osteoporosis and long-term complications, including elevated risk of future fractures  - discussed different classes of medications and how they work.   - educated on preventative measures, including weight-bearing exercises and appropriate intake of calcium and vitamin D. Explained recommended amount of Calcium is 1200 mg a day and Vitamin D is 1000 U a day.  - However in her case, with low Vitamin D level, recommend she take 5000 U of Vit D a day.   - recommend she follow up outpatient for further discussion and evaluation. Patient would be an ideal candidate for [facility].         History of Present Illness   Catherine Lemon is a 74 y.o. female with Hx of Osteoporosis who presented on 10/6 after a fall.   In ER, imaging confirmed Right Pelvic Fracture, which orthopedic surgeons reported she did not need surgery.   She states that she was diagnosed with osteoporosis after a DXA scan in 2018. She was given Fosamax, but was very inconsistent on it and eventually stopped taking it.   She denies family history of bone disorder or fractures.   She denies personal history of dental carries, kidney stones, rheumatoid arthritis, inflammatory bowel disease, malabsorption, and hormone deficiency.   She denies previous steroid use, seizure medications, or cancer treatments.  She states that she  "typically is very active, but may not have \"the greatest diet\". She eats cheese, but does not eat a lot of leafy greens. She is only taking Vitamin B12 supplementation.   She had menopause in her late 40s.     Review of Systems   Constitutional:  Negative for activity change, appetite change, chills, diaphoresis, fatigue, fever and unexpected weight change.   HENT:  Negative for ear pain, sore throat, trouble swallowing and voice change.    Eyes:  Negative for pain and visual disturbance.   Respiratory:  Negative for cough and shortness of breath.    Cardiovascular:  Negative for chest pain and palpitations.   Gastrointestinal:  Negative for abdominal pain, constipation, diarrhea, nausea and vomiting.   Endocrine: Negative for cold intolerance, heat intolerance, polydipsia, polyphagia and polyuria.   Genitourinary:  Negative for dysuria and hematuria.   Musculoskeletal:  Negative for arthralgias, back pain and myalgias.   Skin:  Negative for color change and rash.   Neurological:  Negative for dizziness, tremors, seizures, syncope, weakness and light-headedness.   Psychiatric/Behavioral:  Negative for decreased concentration. The patient is not nervous/anxious and is not hyperactive.    All other systems reviewed and are negative.    I have reviewed the patient's PMH, PSH, Social History, Family History, Meds, and Allergies    Objective :  Temp:  [97.7 °F (36.5 °C)-98.1 °F (36.7 °C)] 98 °F (36.7 °C)  HR:  [69-84] 78  BP: ()/(54-78) 115/69  Resp:  [16-20] 18  SpO2:  [96 %-98 %] 98 %  O2 Device: None (Room air)    Physical Exam  Constitutional:       Appearance: Normal appearance. She is normal weight.      Comments: thin   HENT:      Head: Normocephalic and atraumatic.      Nose: Nose normal.      Mouth/Throat:      Mouth: Mucous membranes are moist.      Pharynx: Oropharynx is clear.   Eyes:      Extraocular Movements: Extraocular movements intact.      Conjunctiva/sclera: Conjunctivae normal. "   Cardiovascular:      Rate and Rhythm: Normal rate and regular rhythm.      Pulses: Normal pulses.   Pulmonary:      Effort: Pulmonary effort is normal. No respiratory distress.   Abdominal:      General: Abdomen is flat.      Palpations: Abdomen is soft.   Musculoskeletal:         General: Normal range of motion.      Cervical back: Normal range of motion.   Skin:     General: Skin is warm and dry.      Capillary Refill: Capillary refill takes less than 2 seconds.   Neurological:      Mental Status: She is alert and oriented to person, place, and time.   Psychiatric:         Mood and Affect: Mood normal.         Behavior: Behavior normal.         Judgment: Judgment normal.            Lab Results: I have reviewed the following results:CBC/BMP: No new results in last 24 hours. , Creatinine Clearance: Estimated Creatinine Clearance: 98 mL/min (A) (by C-G formula based on SCr of 0.49 mg/dL (L))., TSH:   Results from last 7 days   Lab Units 10/06/24  1754   TSH 3RD GENERATON uIU/mL 1.592

## 2024-10-07 NOTE — PLAN OF CARE
Problem: OCCUPATIONAL THERAPY ADULT  Goal: Performs self-care activities at highest level of function for planned discharge setting.  See evaluation for individualized goals.  Description: Treatment Interventions: ADL retraining, Functional transfer training, Endurance training, Patient/family training, Equipment evaluation/education, Compensatory technique education, Energy conservation, Activityengagement          See flowsheet documentation for full assessment, interventions and recommendations.   Note: Limitation: Decreased ADL status, Decreased Safe judgement during ADL, Decreased endurance, Decreased self-care trans, Decreased high-level ADLs  Prognosis: Good  Assessment: 75 YO Female SEEN FOR INITIAL OCCUPATIONAL THERAPY EVALUATION FOLLOWING ADMISSION TO [facility] S/ FALL WHILE WALKING HER DOG RESULTING IN LC1 PELVIC FX. PER ORTHO, PT IS WBAT ON BLE. PT IS FROM HOME WITH SPOUSE WHO SHE IS THE PRIMARY CAREGIVER FOR. PT REPORTS BEING FULLY INDEPENDENT AT BASELINE. PT CURRENTLY REQUIRES OVERALL MIN-MOD A WITH ADLS AND MIN A WITH TRANSFERS / FUNCTIONAL MOBILITY WITH USE OF RW- ADDITIONAL TIME REQUIRED TO COMPLETE ALL TASKS. PT IS LIMITED 2' PAIN, FATIGUE, IMPAIRED BALANCE, FALL RISK , ORTHOPEDIC RESTRICTIONS, MILDLY ANXIOUS/FEARFUL OF PAIN, LIMITED FAMILY/FRIEND SUPPORT , INACCESSIBLE HOME ENVIRONMENT, and OVERALL LIMITED ACTIVITY TOLERANCE. PT EDUCATED ON DEEP BREATHING TECHNIQUES T/O ACTIVITY, SLOWING OF PACE, ENERGY CONSERVATION TECHNIQUES FOR CARRY OVER UPON D/C, INCREASED FAMILY SUPPORT, and CONTINUE PARTICIPATION IN SELF-CARE/MOBILITY WITH STAFF WHILE IN THE HOSPITAL . The patient's raw score on the AM-PAC Daily Activity Inpatient Short Form is 16. A raw score of less than 19 suggests the patient may benefit from discharge to post-acute rehabilitation services. Please refer to the recommendation of the Occupational Therapist for safe discharge planning.  FROM AN OCCUPATIONAL THERAPY  PERSPECTIVE, CURRENTLY RECOMMEND LEVEL I RESOURCES UPON D/C. WILL CONT TO FOLLOW TO ADDRESS THE BELOW DESCRIBED GOALS.     Rehab Resource Intensity Level, OT: I (Maximum Resource Intensity)

## 2024-10-07 NOTE — ASSESSMENT & PLAN NOTE
Imaging noted posterior disc herniation at L5-S1 with left foraminal narrowing  Presented yesterday after sustaining mechanical fall after getting tangled in her dog's leash on 10/5  Patient noted to have right pelvic fractures    Imaging:    CT pelvis without 10/6/2024:Nondisplaced crush fracture of the right sacrum. Nondisplaced fractures of the right inferior and superior pubic rami. 2 superior pubic rami fractures are noted one adjacent to the pubic symphysis and a second anterior to the right acetabulum. FFP type   IIb fracture.Osteoporosis.Posterior disc extrusion at L5-S1 that extends into the left neural foramen with marked left-sided neural foraminal narrowing.    Plan:  Reviewed imaging with patient, she is not symptomatic from left-sided foraminal narrowing at L5-S1.  She has pinpoint pain in her right buttock without radiation.  He denies any back pain.  No neurosurgical invention warranted.  Medical management and pain control per primary team  Mobilize with PT/OT  DVT PPX: SCDs and Lovenox    Neurosurgery will sign off, patient to follow-up as needed or if symptoms worsen, call with any further question or concerns.

## 2024-10-07 NOTE — OCCUPATIONAL THERAPY NOTE
Occupational Therapy Evaluation     Patient Name: Catherine Lemon  Today's Date: 10/7/2024  Problem List  Active Problems:    Closed fracture of pelvis (HCC)    Fall    Abnormal CT scan, pelvis    Past Medical History  History reviewed. No pertinent past medical history.  Past Surgical History  History reviewed. No pertinent surgical history.      10/07/24 1121   OT Last Visit   OT Visit Date 10/07/24   Note Type   Note type Evaluation   Pain Assessment   Pain Assessment Tool 0-10   Pain Score 8   Pain Location/Orientation Location: Pelvis   Patient's Stated Pain Goal No pain   Hospital Pain Intervention(s) Repositioned;Ambulation/increased activity;Emotional support   Restrictions/Precautions   Weight Bearing Precautions Per Order Yes   RLE Weight Bearing Per Order WBAT   LLE Weight Bearing Per Order WBAT   Other Precautions Chair Alarm;Bed Alarm;Multiple lines;Fall Risk;Pain;WBS   Home Living   Type of Home House   Home Layout Two level;Stairs to enter with rails   Home Equipment Walker   Additional Comments NO USE OF DME AT BASELINE   Prior Function   Level of [place] Independent with ADLs;Independent with functional mobility;Independent with IADLS   Lives With Spouse   Receives Help From Family   IADLs Independent with driving;Independent with meal prep;Independent with medication management   Falls in the last 6 months 1 to 4  (1- REASON FOR ADMISSION)   Vocational Retired   Lifestyle   [employer] PT REPORTS BEING I WITH ADLS/IADLS/DRIVING PTA.   Reciprocal Relationships LIVES WITH SPOUSE, WHO SHE IS THE PRIMARY CAREGIVER FOR INCLUDING ASSIST WITH ADLS/IADLS. LOCAL SUPPORTIVE SISTER.   Service to Others RETIRED; WORKED AT A [employer]   Intrinsic Gratification ENJOYS TAKING HER DOG FOR WALKS   ADL   Eating Assistance 7  Independent   Grooming Assistance 5  Supervision/Setup   UB Bathing Assistance 4  Minimal Assistance   LB Bathing Assistance 3  Moderate Assistance   UB Dressing Assistance 4   Minimal Assistance   LB Dressing Assistance 3  Moderate Assistance   Toileting Assistance  3  Moderate Assistance   Functional Assistance 3  Moderate Assistance   Bed Mobility   Supine to Sit 3  Moderate assistance   Additional items Assist x 1;Increased time required;Verbal cues;LE management   Sit to Supine Unable to assess   Additional Comments PT LEFT OOB WITH ALL NEEDS IN REACH + CHAIR ALARM ACTIVATED.   Transfers   Sit to Stand 4  Minimal assistance   Additional items Assist x 1;Increased time required;Verbal cues   Stand to Sit 4  Minimal assistance   Additional items Assist x 1;Increased time required;Verbal cues   Functional Mobility   Functional Mobility 4  Minimal assistance   Additional items Rolling walker   Balance   Static Sitting Fair -   Static Standing Poor +   Ambulatory Poor +   Activity Tolerance   Activity Tolerance Patient limited by fatigue;Patient limited by pain   Medical Staff Made Aware PT SEEN FOR CO-EVAL WITH SKILLED PHYSICAL THERAPIST 2' TRAUAMTIC INJURIES, NEW PRECAUTIONS/LIMITATIONS, AND LIMITED ACTIVITY TOLERANCE WHICH IMPACT PERFORMANCE AND ARE A REGRESSION FROM PT'S BASELINE.   Nurse Made Aware APPROPRIATE TO SEE PER RN.   RUE Assessment   RUE Assessment WFL   LUE Assessment   LUE Assessment WFL   Hand Function   Gross Motor Coordination Functional   Fine Motor Coordination Functional   Cognition   Overall Cognitive Status WFL   Arousal/Participation Alert;Cooperative   Attention Within functional limits   Orientation Level Oriented X4   Memory Within functional limits   Following Commands Follows one step commands without difficulty   Comments PT IS PLEASANT AND COOPERATIVE. PT MILDLY ANXIOUS/FEARFUL OF PAIN AND EASILY DISTRACTED   Assessment   Limitation Decreased ADL status;Decreased Safe judgement during ADL;Decreased endurance;Decreased self-care trans;Decreased high-level ADLs   Prognosis Good   Assessment 73 YO Female SEEN FOR INITIAL OCCUPATIONAL THERAPY EVALUATION  FOLLOWING ADMISSION TO [facility] S/P FALL WHILE WALKING HER DOG RESULTING IN LC1 PELVIC FX. PER ORTHO, PT IS WBAT ON BLE. PT IS FROM HOME WITH SPOUSE WHO SHE IS THE PRIMARY CAREGIVER FOR. PT REPORTS BEING FULLY INDEPENDENT AT BASELINE. PT CURRENTLY REQUIRES OVERALL MIN-MOD A WITH ADLS AND MIN A WITH TRANSFERS / FUNCTIONAL MOBILITY WITH USE OF RW- ADDITIONAL TIME REQUIRED TO COMPLETE ALL TASKS. PT IS LIMITED 2' PAIN, FATIGUE, IMPAIRED BALANCE, FALL RISK , ORTHOPEDIC RESTRICTIONS, MILDLY ANXIOUS/FEARFUL OF PAIN, LIMITED FAMILY/FRIEND SUPPORT , INACCESSIBLE HOME ENVIRONMENT, and OVERALL LIMITED ACTIVITY TOLERANCE. PT EDUCATED ON DEEP BREATHING TECHNIQUES T/O ACTIVITY, SLOWING OF PACE, ENERGY CONSERVATION TECHNIQUES FOR CARRY OVER UPON D/C, INCREASED FAMILY SUPPORT, and CONTINUE PARTICIPATION IN SELF-CARE/MOBILITY WITH STAFF WHILE IN THE HOSPITAL . The patient's raw score on the AM-PAC Daily Activity Inpatient Short Form is 16. A raw score of less than 19 suggests the patient may benefit from discharge to post-acute rehabilitation services. Please refer to the recommendation of the Occupational Therapist for safe discharge planning.  FROM AN OCCUPATIONAL THERAPY PERSPECTIVE, CURRENTLY RECOMMEND LEVEL I RESOURCES UPON D/C. WILL CONT TO FOLLOW TO ADDRESS THE BELOW DESCRIBED GOALS.   Goals   Patient Goals TO HAVE LESS PAIN   LTG Time Frame 10-14   Long Term Goal #1 SEE BELOW   Plan   Treatment Interventions ADL retraining;Functional transfer training;Endurance training;Patient/family training;Equipment evaluation/education;Compensatory technique education;Energy conservation;Activityengagement   Goal Expiration Date 10/21/24   OT Frequency 3-5x/wk   Discharge Recommendation   Rehab Resource Intensity Level, OT I (Maximum Resource Intensity)   -PAC Daily Activity Inpatient   Lower Body Dressing 2   Bathing 2   Toileting 2   Upper Body Dressing 3   Grooming 3   Eating 4   Daily Activity Raw Score 16   Daily  Activity Standardized Score (Calc for Raw Score >=11) 35.96   AM-PAC Applied Cognition Inpatient   Following a Speech/Presentation 3   Understanding Ordinary Conversation 4   Taking Medications 4   Remembering Where Things Are Placed or Put Away 4   Remembering List of 4-5 Errands 3   Taking Care of Complicated Tasks 3   Applied Cognition Raw Score 21   Applied Cognition Standardized Score 44.3       OCCUPATIONAL THERAPY GOALS TO BE MET WITHIN 14 DAYS:    -Pt will increase bed mobility to MOD I to participate in functional activities with G tolerance and balance.  -Pt will improve functional mobility and transfers to MOD I on/off all surfaces w/ G balance/safety including toileting.  -Pt will participate in lt grooming task with MOD I after set-up standing at sink ~3-5 minutes with G safety and balance.   -Pt will increase independence in all ADLS to MOD I with G balance sitting upright in chair.  -Pt will improve activity tolerance to G for 30 min txment sessions w/ G carry over of learned energy conservation techniques.  -Pt will improve independence in lt homemaking activities to MOD I without requiring cues for safety.  -Pt will demonstrate G carryover of learned safety techniques and proper body mechanics in functional and leisure activities with use of DME.  -Pt will identify 2-3 coping strategies that promote G mental health that can be completed within the hospital and carry over to d/c environment.       Documentation completed by DEEJAY Mandujano OTR/L  MOCA Certified ID# [id number]

## 2024-10-07 NOTE — PLAN OF CARE
Problem: PHYSICAL THERAPY ADULT  Goal: Performs mobility at highest level of function for planned discharge setting.  See evaluation for individualized goals.  Description: Treatment/Interventions: Functional transfer training, LE strengthening/ROM, Elevations, Therapeutic exercise, Endurance training, Patient/family training, Equipment eval/education, Bed mobility, Gait training, Spoke to nursing, OT  Equipment Recommended: Walker       See flowsheet documentation for full assessment, interventions and recommendations.  Note: Prognosis: Good  Problem List: Decreased strength, Decreased range of motion, Decreased endurance, Decreased mobility, Pain, Orthopedic restrictions  Assessment: Pt is 74 y.o. female seen for PT evaluation s/p admit to [facility] on 10/6/2024. Two pt identifiers were used to confirm. Pt presented s/p fall while walking dog.  Pt was admitted with a primary dx of: LC1 pelvic fx, and other active problems including abnormal CT scan of pelvis. Per ortho, Pt is WBAT to b/l LE.  PT now consulted for assessment of mobility and d/c needs. Pt with Up in chair orders.  Pts current co morbidities affecting treatment include:  has no past medical history on file. . Pts current clinical presentation is Unstable/ Unpredictable (high complexity) due to Ongoing medical management for primary dx, Increased reliance on more restrictive AD compared to baseline, Decreased activity tolerance compared to baseline, Fall risk, Increased assistance needed from caregiver at current time, Continuous pulse oximetry monitoring   .  Upon evaluation, pt currently is requiring Mod Ax1 for bed mobility; Min Ax1 for transfers and Min Ax1 for ambulation w/ RW. Pt presents at PT eval functioning below baseline and currently w/ overall mobility deficits 2* to: BLE weakness, decreased ROM, impaired balance, decreased endurance, gait deviations, pain, decreased activity tolerance compared to baseline, fall risk. Pt  currently at a fall risk 2* to impairments listed above.  Based on the aforementioned PT evaluation, pt will continue to benefit from skilled Acute PT interventions to address stated impairments; to maximize functional mobility; for ongoing pt/ family training; and DME needs. At conclusion of PT session pt returned back in chair with phone and call bell within reach. Pt denies any further questions at this time. PT is currently recommending Level I resource intensity. PT will continue to follow during hospital stay.        Rehab Resource Intensity Level, PT: I (Maximum Resource Intensity)    See flowsheet documentation for full assessment.

## 2024-10-07 NOTE — PHYSICAL THERAPY NOTE
PHYSICAL THERAPY EVALUATION  NAME:  Catherine Lemon  DATE: 10/07/24    AGE:   74 y.o.  Mrn:   [id number]  ADMIT DX:  Hip fracture ([facility]) [S72.009A]  Right hip pain [M25.551]  Fall, initial encounter [W19.XXXA]  Other injury of unspecified body region, initial encounter [T14.8XXA]  Multiple closed fractures of pelvis with stable disruption of pelvic ring, initial encounter ([facility]) [S32.810A]    History reviewed. No pertinent past medical history.    History reviewed. No pertinent surgical history.    Length Of Stay: 1    PHYSICAL THERAPY EVALUATION:        10/07/24 1120   Note Type   Note type Evaluation   Pain Assessment   Pain Assessment Tool 0-10   Pain Score 8   Pain Location/Orientation Location: Pelvis   Pain Onset/Description Onset: Ongoing;Frequency: Constant/Continuous;Descriptor: Aching   Effect of Pain on Daily Activities increased pain with activity   Patient's Stated Pain Goal No pain   Hospital Pain Intervention(s) Ambulation/increased activity;Repositioned   Restrictions/Precautions   Weight Bearing Precautions Per Order Yes   RLE Weight Bearing Per Order WBAT   LLE Weight Bearing Per Order WBAT   Other Precautions Chair Alarm;Bed Alarm;Fall Risk;Pain   Home Living   Type of Home House   Home Layout Two level;Stairs to enter with rails   Home Equipment Walker   Additional Comments Pt reports living with spouse who pt is the primary caregiver for. Pt states she does have local family who are able to assist her and are assisting her spouse while pt is in the hospital   Prior Function   Level of [place] Independent with functional mobility   Lives With Spouse   Receives Help From Family   Falls in the last 6 months 1 to 4   Comments Pt denies the use of an AD for ambulation PTA   General   Family/Caregiver Present No   Cognition   Overall Cognitive Status WFL   Arousal/Participation Alert   Orientation Level Oriented X4   Memory Within functional limits   Following Commands Follows one step commands  with increased time or repetition   RUE Assessment   RUE Assessment WFL   LUE Assessment   LUE Assessment WFL   RLE Assessment   RLE Assessment X   Strength RLE   RLE Overall Strength 3+/5   LLE Assessment   LLE Assessment X   Strength LLE   LLE Overall Strength 4/5   Bed Mobility   Supine to Sit 3  Moderate assistance   Additional items Assist x 1;Increased time required;Verbal cues   Transfers   Sit to Stand 4  Minimal assistance   Additional items Assist x 1;Increased time required;Verbal cues   Stand to Sit 4  Minimal assistance   Additional items Assist x 1;Increased time required;Verbal cues   Ambulation/Elevation   Gait pattern Excessively slow;Short stride;Foward flexed;Inconsistent cinda   Gait Assistance 4  Minimal assist   Additional items Assist x 1   Assistive Device Rolling walker   Distance 3ft  (limited by pain)   Balance   Static Sitting Fair -   Static Standing Poor +   Ambulatory Poor +   Endurance Deficit   Endurance Deficit Yes   Endurance Deficit Description fatigue, pain   Activity Tolerance   Activity Tolerance Patient limited by fatigue;Patient limited by pain   Medical Staff Made Aware Chise, OT; OT present for co evaluation due to pts current medical presentation   Nurse Made Aware Pt appropriate to be seen and mobilize per nsg   Assessment   Prognosis Good   Problem List Decreased strength;Decreased range of motion;Decreased endurance;Decreased mobility;Pain;Orthopedic restrictions   Assessment Pt is 74 y.o. female seen for PT evaluation s/p admit to [facility] on 10/6/2024. Two pt identifiers were used to confirm. Pt presented s/p fall while walking dog.  Pt was admitted with a primary dx of: LC1 pelvic fx, and other active problems including abnormal CT scan of pelvis. Per ortho, Pt is WBAT to b/l LE.  PT now consulted for assessment of mobility and d/c needs. Pt with Up in chair orders.  Pts current co morbidities affecting treatment include:  has no past medical history on  "file. . Pts current clinical presentation is Unstable/ Unpredictable (high complexity) due to Ongoing medical management for primary dx, Increased reliance on more restrictive AD compared to baseline, Decreased activity tolerance compared to baseline, Fall risk, Increased assistance needed from caregiver at current time, Continuous pulse oximetry monitoring   .  Upon evaluation, pt currently is requiring Mod Ax1 for bed mobility; Min Ax1 for transfers and Min Ax1 for ambulation w/ RW. Pt presents at PT eval functioning below baseline and currently w/ overall mobility deficits 2* to: BLE weakness, decreased ROM, impaired balance, decreased endurance, gait deviations, pain, decreased activity tolerance compared to baseline, fall risk. Pt currently at a fall risk 2* to impairments listed above.  Based on the aforementioned PT evaluation, pt will continue to benefit from skilled Acute PT interventions to address stated impairments; to maximize functional mobility; for ongoing pt/ family training; and DME needs. At conclusion of PT session pt returned back in chair with phone and call bell within reach. Pt denies any further questions at this time. PT is currently recommending Level I resource intensity. PT will continue to follow during hospital stay.   Goals   Patient Goals \" to have less pain\"   [facility] Expiration Date 10/21/24   Short Term Goal #1 In 14 days pt will complete: 1) Bed mobility skills with mod I to increase safety and independence as well as decrease caregiver burden. 2) Functional transfers with mod I to promote increased independence, safety, and QOL. 3) Ambulate 200' using least restrictive AD with mod I without LOB and stable vitals so that pt can negotiate previous living environment safely and promote independence with functional mobility and return to PLOF. 4) Stair training up/ down 6 step/s using rail/s with mod I so that pt can enter/negotiate previous living environment safely and decrease fall " risk. 5) Improve balance grades by 1/2 grade to increase safety with all mobility and decrease fall risk.  6) Improve BLE strength by 1/2 grade to help increase overall functional mobility and decrease fall risk.   Plan   Treatment/Interventions Functional transfer training;LE strengthening/ROM;Elevations;Therapeutic exercise;Endurance training;Patient/family training;Equipment eval/education;Bed mobility;Gait training;Spoke to nursing;OT   PT Frequency 3-5x/wk   Discharge Recommendation   Rehab Resource Intensity Level, PT I (Maximum Resource Intensity)   Equipment Recommended Walker   Walker Package Recommended Wheeled walker   AM-PAC Basic Mobility Inpatient   Turning in Flat Bed Without Bedrails 2   Lying on Back to Sitting on Edge of Flat Bed Without Bedrails 2   Moving Bed to Chair 3   Standing Up From Chair Using Arms 3   Walk in Room 3   Climb 3-5 Stairs With Railing 2   Basic Mobility Inpatient Raw Score 15   Basic Mobility Standardized Score 36.97   [facility] Highest Level Of Mobility   JH-HLM Goal 4: Move to chair/commode   JH-HLM Achieved 4: Move to chair/commode   Modified [place]rick Scale   Modified [place]rick Scale 4   Portions of the documentation may have been created using voice recognition software.Occasional wrong word or sound alike substitutions may have occurred due to the inherent limitations of the voice recognition software. Read the chart carefully and recognize, using context, where substitutions have occurred.    Wilbur Roque, PT, DPT

## 2024-10-07 NOTE — UTILIZATION REVIEW
Initial Clinical Review    Admission: Date/Time/Statement:   Admission Orders (From admission, onward)       Ordered        10/06/24 1421  Inpatient Admission  Once                          Orders Placed This Encounter   Procedures    Inpatient Admission     Standing Status:   Standing     Number of Occurrences:   1     Order Specific Question:   Level of Care     Answer:   Med Surg [16]     Order Specific Question:   Estimated length of stay     Answer:   More than 2 Midnights     Order Specific Question:   Certification     Answer:   I certify that inpatient services are medically necessary for this patient for a duration of greater than two midnights. See H&P and MD Progress Notes for additional information about the patient's course of treatment.     ED Arrival Information       Expected   10/6/2024     Arrival   10/6/2024 11:02    Acuity   Urgent              Means of arrival   Ambulance    Escorted by   [facility]    Service   Trauma    Admission type   Emergency              Arrival complaint   fall             Chief Complaint   Patient presents with    Hip Pain     Pt fell yesterday while walking her dog and has not been able to move her R hip since. Bruising to R arm. -loc, -h/s       Initial Presentation: 74 y.o. female who presented by EMS to [facility] ED. Admitted as Inpatient for evaluation and treatment of S/P Fall with Pelvis fracture.     PMHx:  has no past medical history on file.      Presented w/ S/P fall yesterday. Came to ED today due to pain and unable to move R LE. On exam, tenderness over her lumbosacral spine as well as over her posterior right ischium. Abnormal Labs Vit D 25-Hydroxy 23.3. ICT Pelvis: Nondisplaced crush fracture of the right sacrum. Nondisplaced fractures of the right inferior and superior pubic rami. 2 superior pubic rami fractures are noted one adjacent to the pubic symphysis and a second anterior to the right acetabulum. FFP type IIb  fracture. Osteoporosis.Posterior disc extrusion at L5-S1 that extends into the left neural foramen with marked left-sided neural foraminal narrowing. In the ED, pt received Fentanyl IV x1, Dilaudid IV x2, Robaxin, Tylenol and colace.     Plan: NWB at this time. Neurovascular check.DVT prophylaixs. Ortho and Neurosurgery consulted.    Ortho Consult: Pelvic fractures.Plan:  Non-operative management. WBAT, Pain control, Metabolic bone/endocrine consult, DVT ppx ASA BID, Resume diet. PT/OT    Date: 10/7/24   Day 2: Pt denies new complaints and states feels better and looking forward to working with PT. Pain well controlled. On exam, Able to flex hips bilaterally to 80 degrees, knee motion 0 to 120 degrees. Non-tender to palpation, pelvis stable on exam. Plan: Repeat pelvis Xray, DVT ppx ASA BID, WBAT. Therapy evals pending. Endo consult pending. Pain control.     Neurosurgery consult: Abd CT pelvis. Imaging noted posterior disc herniation at L5-S1 with left foraminal narrowing. Pt is not symptomatic from L sided foraminal narrowing at L5-S1. Pt has pinpoint pain in her R buttock without radiation. Denies any back pain. No neurosurgical intervention needed. Mobilize w/ PT/OT. DVT PPX - SCDs and Lovenox.        ED Treatment-Medication Administration from 10/06/2024 1102 to 10/06/2024 1656         Date/Time Order Dose Route Action     10/06/2024 1108 fentanyl citrate (PF) (FOR EMS ONLY) 100 mcg/2 mL injection 100 mcg 0 mcg Does not apply Given to EMS     10/06/2024 1128 HYDROmorphone (DILAUDID) injection 0.5 mg 0.5 mg Intravenous Given     10/06/2024 1432 methocarbamol (ROBAXIN) tablet 500 mg 500 mg Oral Given     10/06/2024 1432 acetaminophen (TYLENOL) tablet 975 mg 975 mg Oral Given     10/06/2024 1521 HYDROmorphone HCl (DILAUDID) injection 0.2 mg 0.2 mg Intravenous Given     10/06/2024 1432 docusate sodium (COLACE) capsule 100 mg 100 mg Oral Given            Scheduled Medications:  acetaminophen, 975 mg, Oral, Q8H  JENNIFER  docusate sodium, 100 mg, Oral, BID  enoxaparin, 30 mg, Subcutaneous, Q12H  methocarbamol, 500 mg, Oral, Q8H JENNIFER  senna, 2 tablet, Oral, HS      Continuous IV Infusions: NONE      PRN Meds:  HYDROmorphone, 0.2 mg, Intravenous, Q3H PRN - given x1 10/6.  oxyCODONE, 5 mg, Oral, Q4H PRN - given x1 10/6.  oxyCODONE, 2.5 mg, Oral, Q4H PRN      ED Triage Vitals   Temperature Pulse Respirations Blood Pressure SpO2 Pain Score   10/06/24 1115 10/06/24 1110 10/06/24 1110 10/06/24 1110 10/06/24 1110 10/06/24 1112   98 °F (36.7 °C) 85 20 (!) 175/93 100 % 8     Weight (last 2 days)       Date/Time Weight    10/06/24 17:09:08 64 (141)    10/06/24 1115 64 (141.09)            Vital Signs (last 3 days)       Date/Time Temp Pulse Resp BP MAP (mmHg) SpO2 O2 Device Pain    10/07/24 07:11:32 98 °F (36.7 °C) 78 18 111/60 77 97 % -- --    10/07/24 03:30:15 98.1 °F (36.7 °C) 69 17 112/59 77 98 % -- --    10/06/24 2300 -- 79 -- 102/64 77 96 % -- --    10/06/24 22:59:39 -- 84 -- 102/64 77 96 % -- --    10/06/24 22:57:31 -- 78 -- 92/54 67 96 % -- --    10/06/24 22:21:48 97.7 °F (36.5 °C) 79 16 87/54 65 96 % -- --    10/06/24 1727 -- -- -- -- -- -- -- 10 - Worst Possible Pain    10/06/24 1719 -- -- -- -- -- -- None (Room air) --    10/06/24 17:09:08 97.8 °F (36.6 °C) 79 18 149/73 98 98 % None (Room air) 10 - Worst Possible Pain    10/06/24 1521 -- -- -- -- -- -- -- 10 - Worst Possible Pain    10/06/24 1500 -- 82 20 170/78 112 97 % None (Room air) --    10/06/24 1400 -- 77 16 126/70 93 98 % None (Room air) --    10/06/24 1200 -- 75 18 137/67 93 100 % None (Room air) --    10/06/24 1115 98 °F (36.7 °C) -- -- -- -- -- -- --    10/06/24 1112 -- -- -- -- -- -- -- 8    10/06/24 1110 -- 85 20 175/93 -- 100 % None (Room air) --              Pertinent Labs/Diagnostic Test Results:   Radiology:  CT pelvis wo contrast   Final Interpretation by Taj Mcgee MD (10/06 1257)      Nondisplaced crush fracture of the right sacrum. Nondisplaced  fractures of the right inferior and superior pubic rami. 2 superior pubic rami fractures are noted one adjacent to the pubic symphysis and a second anterior to the right acetabulum. FFP type    IIb fracture.      Osteoporosis.      Posterior disc extrusion at L5-S1 that extends into the left neural foramen with marked left-sided neural foraminal narrowing.      The study was marked in EPIC for immediate notification.            Workstation performed: [id number]         CT lumbar spine without contrast   Final Interpretation by Jessica Laguna MD (10/06 1301)   No acute compression collapse of the vertebra   Fracture of the right sacral ala with mild widening of the right sacroiliac joint      Workstation performed: [id number]         XR hip/pelv 2-3 vws right if performed    (Results Pending)           Results from last 7 days   Lab Units 10/06/24  1754   PLATELETS Thousands/uL 187         Results from last 7 days   Lab Units 10/06/24  1753   SODIUM mmol/L 137   POTASSIUM mmol/L 4.1   CHLORIDE mmol/L 103   CO2 mmol/L 26   ANION GAP mmol/L 8   BUN mg/dL 8   CREATININE mg/dL 0.49*   EGFR ml/min/1.73sq m 96   CALCIUM mg/dL 8.9     Results from last 7 days   Lab Units 10/06/24  1753   AST U/L 21   ALT U/L 20   ALK PHOS U/L 61   TOTAL PROTEIN g/dL 6.3*   ALBUMIN g/dL 4.0   TOTAL BILIRUBIN mg/dL 0.83         Results from last 7 days   Lab Units 10/06/24  1753   GLUCOSE RANDOM mg/dL 117         Results from last 7 days   Lab Units 10/06/24  1754   TSH 3RD GENERATON uIU/mL 1.592         Results from last 7 days   Lab Units 10/06/24  1754   SED RATE mm/hour 2           History reviewed. No pertinent past medical history.  Present on Admission:  **None**      Admitting Diagnosis: Hip fracture (HCC) [S72.009A]  Right hip pain [M25.551]  Fall, initial encounter [W19.XXXA]  Other injury of unspecified body region, initial encounter [T14.8XXA]  Multiple closed fractures of pelvis with stable disruption of pelvic ring, initial  encounter ([facility]) [S32.810A]  Age/Sex: 74 y.o. female    Network Utilization Review Department  ATTENTION: Please call with any questions or concerns to [phone] and carefully listen to the prompts so that you are directed to the right person. All voicemails are confidential.   For Discharge needs, contact Care Management DC Support Team at [phone] opt. 2  Send all requests for admission clinical reviews, approved or denied determinations and any other requests to dedicated fax number below belonging to the [place] where the patient is receiving treatment. List of dedicated fax numbers for the Facilities:  FACILITY NAME UR FAX NUMBER   ADMISSION DENIALS (Administrative/Medical Necessity) [phone]   DISCHARGE SUPPORT TEAM (NETWORK) [phone]   PARENT CHILD HEALTH (Maternity/NICU/Pediatrics) [phone]   [facility] [phone]   [facility] [phone]   [facility] [phone]   [facility] [phone]   [facility] [phone]   [facility] [phone]   [facility] [phone]   [facility] [phone]   [facility] [phone]   [facility] [phone]   [facility] [phone]   [facility] [phone]

## 2024-10-07 NOTE — PROGRESS NOTES
Patient:    MRN:  [id number]    Aidin Request ID:  [id number]    Level of care reserved:  Inpatient Rehab Facility    Partner Reserved:  [facility] - ([place]), [place] [id number] [phone]    Clinical needs requested:    Geography searched:  10 miles around [id number]    Start of Service:    Request sent:  3:26pm EDT on 10/7/2024 by Gwyn Jacques    Partner reserved:  3:57pm EDT on 10/7/2024 by Gwyn Jacques    Choice list shared:  3:57pm EDT on 10/7/2024 by Gwyn Jacques

## 2024-10-07 NOTE — ASSESSMENT & PLAN NOTE
Tripped and fell while walking her dog on 10/5  Pain in RLE and pelvis with walking and when lying still so came to ED on 10/6  10/6 CT pelvis - nondisplaced crush fracture of right sacrum, nondisplaced fractures of the right interior and superior pubic rami. Posterior disc extrusion at L5-S1 that extendins into left neural foramen with marked left sided neural foraminal narrowing   Orthopedics consulted, appreciate recommendations  10/7 - repeat pelvis XR   DVT ppx ASA 81 BID  PT/OT  WBAT BL LE

## 2024-10-07 NOTE — PLAN OF CARE
Problem: Knowledge Deficit  Goal: Patient/family/caregiver demonstrates understanding of disease process, treatment plan, medications, and discharge instructions  Description: Complete learning assessment and assess knowledge base.  Interventions:  - Provide teaching at level of understanding  - Provide teaching via preferred learning methods  Outcome: Progressing     Problem: DISCHARGE PLANNING  Goal: Discharge to home or other facility with appropriate resources  Description: INTERVENTIONS:  - Identify barriers to discharge w/patient and caregiver  - Arrange for needed discharge resources and transportation as appropriate  - Identify discharge learning needs (meds, wound care, etc.)  - Arrange for interpretive services to assist at discharge as needed  - Refer to Case Management Department for coordinating discharge planning if the patient needs post-hospital services based on physician/advanced practitioner order or complex needs related to functional status, cognitive ability, or social support system  Outcome: Progressing     Problem: PAIN - ADULT  Goal: Verbalizes/displays adequate comfort level or baseline comfort level  Description: Interventions:  - Encourage patient to monitor pain and request assistance  - Assess pain using appropriate pain scale  - Administer analgesics based on type and severity of pain and evaluate response  - Implement non-pharmacological measures as appropriate and evaluate response  - Consider cultural and social influences on pain and pain management  - Notify physician/advanced practitioner if interventions unsuccessful or patient reports new pain  Outcome: Progressing     Problem: Potential for Falls  Goal: Patient will remain free of falls  Description: INTERVENTIONS:  - Educate patient/family on patient safety including physical limitations  - Instruct patient to call for assistance with activity   - Consult OT/PT to assist with strengthening/mobility   - Keep Call bell  within reach  - Keep bed low and locked with side rails adjusted as appropriate  - Keep care items and personal belongings within reach  - Initiate and maintain comfort rounds    - Apply yellow socks and bracelet for high fall risk patients  - Consider moving patient to room near nurses station  Outcome: Progressing

## 2024-10-07 NOTE — PROGRESS NOTES
"Progress Note - Trauma   Name: Catherine Lemon 74 y.o. female I MRN: [id number]  Unit/Bed#: [facility] 619-01 I Date of Admission: 10/6/2024   Date of Service: 10/7/2024 I Hospital Day: 1    Assessment & Plan  Closed fracture of pelvis (HCC)  Tripped and fell while walking her dog on 10/5  Pain in RLE and pelvis with walking and when lying still so came to ED on 10/6  10/6 CT pelvis - nondisplaced crush fracture of right sacrum, nondisplaced fractures of the right interior and superior pubic rami. Posterior disc extrusion at L5-S1 that extendins into left neural foramen with marked left sided neural foraminal narrowing   Orthopedics consulted, appreciate recommendations  10/7 - repeat pelvis XR   DVT ppx ASA 81 BID  PT/OT  WBAT BL LE  Fall  Fall while walking her small dog 10/5  Pain in RLE and pelvis with ambulation and when lying still  Unable to get up so came to ER 10/6  CT lumbar spine - no acute compression of vertebral, right sacral fracture    VTE Prophylaxis: Enoxaparin (Lovenox)     Disposition: pending PT/OT and orthopedics     TRAUMA TERTIARY SURVEY  Summary of Diagnosed Injuries: nondisplaced crush fracture of the right sacrum, nondisplaced fracture of right inferior and superior pubic rami. Abrasion / ecchymosis to right elbow    Transfer from: [place] ED    Mechanism of Injury:Fall     Chief Complaint: Hip pain    24 Hour Events : No acute events overnight  Subjective : Pain improving, better ability to move both of her legs and states that she is \"working on it\" but is concerned about how she is going to be able to get up to walk / move and go to the bathroom.     Objective :  Temp:  [97.7 °F (36.5 °C)-98.1 °F (36.7 °C)] 98 °F (36.7 °C)  HR:  [69-84] 78  BP: ()/(54-78) 115/69  Resp:  [16-20] 18  SpO2:  [96 %-98 %] 98 %  O2 Device: None (Room air)    I/O       None            Physical Exam  Vitals and nursing note reviewed.   Constitutional:       General: She is not in acute distress.     " Appearance: She is not ill-appearing.   HENT:      Head: Normocephalic and atraumatic.      Mouth/Throat:      Mouth: Mucous membranes are moist.   Eyes:      Conjunctiva/sclera: Conjunctivae normal.   Cardiovascular:      Rate and Rhythm: Normal rate.   Pulmonary:      Effort: Pulmonary effort is normal. No respiratory distress.   Abdominal:      General: There is no distension.   Musculoskeletal:      Cervical back: Neck supple.      Comments: Decreased ROM bilateral lower hips secondary to pain. Intact distal strength, sensation, capillary refill.    Skin:     General: Skin is warm.      Comments: Ecchymosis / abrasion to right elbow   Neurological:      General: No focal deficit present.      Mental Status: She is alert.   Psychiatric:         Mood and Affect: Mood normal.          1. Before the illness or injury that brought you to the Emergency, did you need someone to help you on a regular basis? 0=No   2. Since the illness or injury that brought you to the Emergency, have you needed more help than usual to take care of yourself? 0=No   3. Have you been hospitalized for one or more nights during the past 6 months (excluding a stay in the Emergency Department)? 0=No   4. In general, do you see well? 0=Yes   5. In general, do you have serious problems with your memory? 0=No   6. Do you take more than three different medications everyday? 0=No   TOTAL   0     Did you order a geriatric consult if the score was 2 or greater?: n/a           Lab Results: I have reviewed the following results:  Recent Labs     10/06/24  1753 10/06/24  1754   PLT  --  187   SODIUM 137  --    K 4.1  --      --    CO2 26  --    BUN 8  --    CREATININE 0.49*  --    GLUC 117  --    AST 21  --    ALT 20  --    ALB 4.0  --    TBILI 0.83  --    ALKPHOS 61  --        Imaging Results Review: I reviewed radiology reports from this admission including: CT pelvis.  Other Study Results Review: No additional pertinent studies reviewed.

## 2024-10-07 NOTE — PROGRESS NOTES
"Progress Note - Orthopedics   Name: Catherine Lemon 74 y.o. female I MRN: [id number]  Unit/Bed#: [facility] 619-01 I Date of Admission: 10/6/2024   Date of Service: 10/7/2024 I Hospital Day: 1    Assessment & Plan  Closed fracture of pelvis (HCC)  74 y.o. female S/P fall without head strike on Saturday (10/5) with right LC1 pelvic fracture stable on physical exam, indicated for non-operative management. Doing well.    Plan:   Weight bearing as tolerated BLLE  XR of pelvis  Analgesics for pain  Metabolic bone/endocrine consult/labs placed  DVT ppx ASA 81 mg BID unless medically contraindicated   Diet ok from ortho standpoint  PT/OT  Dispo: Ortho will follow    Weightbearing status: WBAT BLLE  PT/OT  Incentive spirometry  Pain control  DVT ppx ASA 81 mg BID unless medically contraindicated  Diet ok from ortho standpoint  Medical co-morbidities are being managed per primary team  Please contact the SecureChat role,\"[facility]\", with any questions/concerns.    Subjective   74 y.o.female No acute events, no new complaints. Pain well controlled. Denies fevers, chills, CP, SOB, N/V, numbness or tingling. Patient reports no issues with urination or bowel movements. Patient states she feel better and is looking forward to working with PT.    Objective :  Temp:  [97.7 °F (36.5 °C)-98.1 °F (36.7 °C)] 98.1 °F (36.7 °C)  HR:  [69-85] 69  BP: ()/(54-93) 112/59  Resp:  [16-20] 17  SpO2:  [96 %-100 %] 98 %  O2 Device: None (Room air)    Physical Exam  Musculoskeletal: right lower extremity  Skin intact . No erythema or ecchymosis.  Non-tender to palpation, pelvis stable on exam  Able to flex hips bilaterally to 80 degrees, knee motion 0 to 120 degrees  Motor intact to +FHL/EHL, +ankle dorsi/plantar flexion  Sensation intact to saphenous, sural, tibial, superficial peroneal nerve, and deep peroneal  2+ DP pulse  No calf swelling or tenderness to palpation      Lab Results: I have reviewed the following results:  Recent Labs     " "10/06/24  1753 10/06/24  1754   PLT  --  187   BUN 8  --    CREATININE 0.49*  --    ESR  --  2     Blood Culture:  No results found for: \"BLOODCX\"  Wound Culture: No results found for: \"WOUNDCULT\"  "

## 2024-10-07 NOTE — ASSESSMENT & PLAN NOTE
Fall while walking her small dog 10/5  Pain in RLE and pelvis with ambulation and when lying still  Unable to get up so came to ER 10/6  CT lumbar spine - no acute compression of vertebral, right sacral fracture

## 2024-10-07 NOTE — CASE MANAGEMENT
Case Management Discharge Planning Note    Patient name Catherine Lemon  Location [facility] 619/[facility] 619-01 MRN [id number]  : 1950 Date 10/7/2024       Current Admission Date: 10/6/2024  Current Admission Diagnosis:Closed fracture of pelvis (HCC)   Patient Active Problem List    Diagnosis Date Noted Date Diagnosed    Fall 10/07/2024     Abnormal CT scan, pelvis 10/07/2024     Closed fracture of pelvis (HCC) 10/06/2024       LOS (days): 1  Geometric Mean LOS (GMLOS) (days): 2.8  Days to GMLOS:1.8     OBJECTIVE:  Risk of Unplanned Readmission Score: 6.6         Current admission status: Inpatient   Preferred Pharmacy:   [facility] [address] [place], [place] - [address]  [address]  [facility] [address]  Phone: [phone] Fax: [phone]    Primary Care Provider: Douglas Charles Shoenberger, MD    Primary Insurance: [employer]  Secondary Insurance:     DISCHARGE DETAILS:    CM met with pt to discuss the role of CM, as well as d/c planning  Pt was recommended for IP rehab. Pt's in agreement with this plan and would like referral to [facility].   CM placed

## 2024-10-07 NOTE — CONSULTS
Consultation - Neurosurgery   Name: Catherine Lemon 74 y.o. female I MRN: [id number]  Unit/Bed#: [facility] 619-01 I Date of Admission: 10/6/2024   Date of Service: 10/7/2024 I Hospital Day: 1   Inpatient consult to Neurosurgery  Consult performed by: ANASTASIA Borrego  Consult ordered by: ANASTASIA Zamudio        Physician Requesting Evaluation: Criselda Sullivan DO   Reason for Evaluation / Principal Problem: abnormal CT pelvis     Assessment & Plan  Abnormal CT scan, pelvis  Imaging noted posterior disc herniation at L5-S1 with left foraminal narrowing  Presented yesterday after sustaining mechanical fall after getting tangled in her dog's leash on 10/5  Patient noted to have right pelvic fractures    Imaging:    CT pelvis without 10/6/2024:Nondisplaced crush fracture of the right sacrum. Nondisplaced fractures of the right inferior and superior pubic rami. 2 superior pubic rami fractures are noted one adjacent to the pubic symphysis and a second anterior to the right acetabulum. FFP type   IIb fracture.Osteoporosis.Posterior disc extrusion at L5-S1 that extends into the left neural foramen with marked left-sided neural foraminal narrowing.    Plan:  Reviewed imaging with patient, she is not symptomatic from left-sided foraminal narrowing at L5-S1.  She has pinpoint pain in her right buttock without radiation.  He denies any back pain.  No neurosurgical invention warranted.  Medical management and pain control per primary team  Mobilize with PT/OT  DVT PPX: SCDs and Lovenox    Neurosurgery will sign off, patient to follow-up as needed or if symptoms worsen, call with any further question or concerns.  Closed fracture of pelvis (HCC)  Orthopedics following, input appreciated   Nonoperative management    Fall        History of Present Illness   HPI: Catherine Lemon is a 74 y.o.  female no significant past medical history.  Patient initially presented to the hospital yesterday after she sustained a mechanical  fall after being tangled in her dog leash on 10/5.  She did not strike her head or lose consciousness.  She was able to get up with assistance from her .  She noted yesterday she was unable to move with extensive amount of pain and presented to the hospital for further workup and evaluation.  Imaging demonstrated right pelvic fractures.  As well as a posterior disc herniation at L5-S1 with left-sided neuroforaminal narrowing.    Patient states she was able to stand and pivot to the chair with physical therapy today.  She does not have any pain sitting comfortably in the chair.  She states with standing and walking she develops pinpoint right buttock pain.  She denies any low back pain or radiation down her left leg or into her right groin or down her right leg.  She also complains of a headache and dizziness with some nausea when pain is severe.  She denies any bowel or bladder issues or saddle anesthesia.  She has some right leg weakness secondary to pain.  She offers no other complaints.    Review of Systems   Constitutional:  Positive for activity change. Negative for chills and fever.   HENT:  Negative for sore throat and tinnitus.    Eyes:  Negative for visual disturbance.   Respiratory:  Negative for shortness of breath.    Cardiovascular:  Negative for chest pain.   Gastrointestinal:  Positive for nausea. Negative for abdominal pain, constipation, diarrhea and vomiting.   Genitourinary:  Negative for difficulty urinating.   Musculoskeletal:  Negative for back pain, gait problem and neck pain.   Skin:  Positive for wound (R elbow abrasion and ecchymosis).   Neurological:  Positive for dizziness and headaches. Negative for speech difficulty, weakness, light-headedness and numbness.   Psychiatric/Behavioral:  Negative for confusion.        Objective :  Temp:  [97.7 °F (36.5 °C)-98.1 °F (36.7 °C)] 98 °F (36.7 °C)  HR:  [69-84] 78  BP: ()/(54-78) 115/69  Resp:  [16-20] 18  SpO2:  [96 %-98 %] 98 %  O2  Device: None (Room air)    Physical Exam  Vitals reviewed.   Constitutional:       General: She is not in acute distress.     Appearance: Normal appearance. She is not ill-appearing.   HENT:      Head: Normocephalic and atraumatic.   Eyes:      Extraocular Movements: Extraocular movements intact.      Conjunctiva/sclera: Conjunctivae normal.      Pupils: Pupils are equal, round, and reactive to light.   Cardiovascular:      Rate and Rhythm: Normal rate.   Pulmonary:      Effort: Pulmonary effort is normal. No respiratory distress.   Chest:      Chest wall: No tenderness.   Abdominal:      General: There is no distension.      Palpations: Abdomen is soft.      Tenderness: There is no abdominal tenderness.   Musculoskeletal:      Cervical back: Normal range of motion and neck supple.      Thoracic back: No tenderness.      Lumbar back: No tenderness.      Comments: Limited rom in rle 2/2 pain   Skin:     General: Skin is warm and dry.   Neurological:      Mental Status: She is oriented to person, place, and time.      Deep Tendon Reflexes: Reflexes are normal and symmetric.   Psychiatric:         Attention and Perception: Attention and perception normal.         Mood and Affect: Mood and affect normal.         Speech: Speech normal.         Behavior: Behavior normal. Behavior is cooperative.         Thought Content: Thought content normal.         Cognition and Memory: Cognition and memory normal.         Judgment: Judgment normal.      Neurological Exam  Mental Status  Awake, alert and oriented to person, place and time. Oriented to person, place and time. Oriented to person, place, and time. Memory is normal. Recent and remote memory are intact. Speech is normal. Language is fluent with no aphasia. Attention and concentration are normal. Fund of knowledge is appropriate for level of education.    Cranial Nerves  CN I: Sense of smell is normal.  CN III, IV, VI: Extraocular movements intact bilaterally. Pupils equal  round and reactive to light bilaterally.  CN V: Facial sensation is normal.  CN VII: Full and symmetric facial movement.  CN VIII: Hearing is normal.  CN XI: Shoulder shrug strength is normal.  CN XII: Tongue midline without atrophy or fasciculations.    Motor  Normal muscle bulk throughout. Normal muscle tone. Strength is 5/5 in all four extremities except as noted.  R KE 4-/5  R HF 1/5  L HF 4/5 .    Sensory  Sensation is intact to light touch, pinprick, vibration and proprioception in all four extremities.    Reflexes  Deep tendon reflexes are 2+ and symmetric in all four extremities.        Lab Results: I have reviewed the following results:  Recent Labs     10/06/24  1753 10/06/24  1754   PLT  --  187   SODIUM 137  --    K 4.1  --      --    CO2 26  --    BUN 8  --    CREATININE 0.49*  --    GLUC 117  --    AST 21  --    ALT 20  --    ALB 4.0  --    TBILI 0.83  --    ALKPHOS 61  --        Imaging Results Review: I personally reviewed the following image studies in PACS and associated radiology reports: pelvis  and CT  . My interpretation of the radiology images/reports is:  .      VTE Pharmacologic Prophylaxis: Sequential compression device (Venodyne)  and Enoxaparin (Lovenox)

## 2024-10-07 NOTE — PLAN OF CARE
Problem: Potential for Falls  Goal: Patient will remain free of falls  Description: INTERVENTIONS:  - Educate patient/family on patient safety including physical limitations  - Instruct patient to call for assistance with activity   - Consult OT/PT to assist with strengthening/mobility   - Keep Call bell within reach  - Keep bed low and locked with side rails adjusted as appropriate  - Keep care items and personal belongings within reach  - Initiate and maintain comfort rounds  - Make Fall Risk Sign visible to staff  - Offer Toileting every  Hours, in advance of need  - Initiate/Maintain alarm  - Obtain necessary fall risk management equipment  - Apply yellow socks and bracelet for high fall risk patients  - Consider moving patient to room near nurses station  Outcome: Progressing     Problem: PAIN - ADULT  Goal: Verbalizes/displays adequate comfort level or baseline comfort level  Description: Interventions:  - Encourage patient to monitor pain and request assistance  - Assess pain using appropriate pain scale  - Administer analgesics based on type and severity of pain and evaluate response  - Implement non-pharmacological measures as appropriate and evaluate response  - Consider cultural and social influences on pain and pain management  - Notify physician/advanced practitioner if interventions unsuccessful or patient reports new pain  Outcome: Progressing     Problem: DISCHARGE PLANNING  Goal: Discharge to home or other facility with appropriate resources  Description: INTERVENTIONS:  - Identify barriers to discharge w/patient and caregiver  - Arrange for needed discharge resources and transportation as appropriate  - Identify discharge learning needs (meds, wound care, etc.)  - Arrange for interpretive services to assist at discharge as needed  - Refer to Case Management Department for coordinating discharge planning if the patient needs post-hospital services based on physician/advanced practitioner order or  complex needs related to functional status, cognitive ability, or social support system  Outcome: Progressing     Problem: Knowledge Deficit  Goal: Patient/family/caregiver demonstrates understanding of disease process, treatment plan, medications, and discharge instructions  Description: Complete learning assessment and assess knowledge base.  Interventions:  - Provide teaching at level of understanding  - Provide teaching via preferred learning methods  Outcome: Progressing

## 2024-10-07 NOTE — ASSESSMENT & PLAN NOTE
- concerning with severe  - previous DXA scan showed -2.5 T-score of Left femoral neck. She was unable to take Fosamax  - discussed pathophysiology of osteoporosis and long-term complications, including elevated risk of future fractures  - discussed different classes of medications and how they work.   - educated on preventative measures, including weight-bearing exercises and appropriate intake of calcium and vitamin D. Explained recommended amount of Calcium is 1200 mg a day and Vitamin D is 1000 U a day.  - However in her case, with low Vitamin D level, recommend she take 5000 U of Vit D a day.   - recommend she follow up outpatient for further discussion and evaluation. [facility]

## 2024-10-07 NOTE — ASSESSMENT & PLAN NOTE
Fall while walking her small dog  PAin in RLE and pelvis with ambulation and when lying still  Unable to get up so came to ER today

## 2024-10-07 NOTE — ASSESSMENT & PLAN NOTE
74 y.o. female S/P fall without head strike on Saturday (10/5) with right LC1 pelvic fracture stable on physical exam, indicated for non-operative management. Doing well.    Plan:   Weight bearing as tolerated BLLE  XR of pelvis  Analgesics for pain  Metabolic bone/endocrine consult/labs placed  DVT ppx ASA 81 mg BID unless medically contraindicated   Diet ok from ortho standpoint  PT/OT  Dispo: Ortho will follow

## 2024-10-07 NOTE — CASE MANAGEMENT
Case Management Discharge Planning Note    Patient name Catherine Lemon  Location [facility] 619/[facility] 619-01 MRN [id number]  : 1950 Date 10/7/2024       Current Admission Date: 10/6/2024  Current Admission Diagnosis:Closed fracture of pelvis (HCC)   Patient Active Problem List    Diagnosis Date Noted Date Diagnosed    Fall 10/07/2024     Abnormal CT scan, pelvis 10/07/2024     Closed fracture of pelvis (HCC) 10/06/2024       LOS (days): 1  Geometric Mean LOS (GMLOS) (days): 2.8  Days to GMLOS:1.7     OBJECTIVE:  Risk of Unplanned Readmission Score: 6.6         Current admission status: Inpatient   Preferred Pharmacy:   [id number] - [place], [place] - [address]  [address]  [facility] [address]  Phone: [phone] Fax: [phone]    Primary Care Provider: Douglas Charles Shoenberger, MD    Primary Insurance: [employer] RE[employer]  Secondary Insurance:     DISCHARGE DETAILS:    Pt clinically accepted to [facility]  CM will submit for auth in the AM

## 2024-10-08 LAB
ANION GAP SERPL CALCULATED.3IONS-SCNC: 5 MMOL/L (ref 4–13)
ANION GAP SERPL CALCULATED.3IONS-SCNC: 5 MMOL/L (ref 4–13)
BASOPHILS # BLD AUTO: 0.03 THOUSANDS/ΜL (ref 0–0.1)
BASOPHILS NFR BLD AUTO: 1 % (ref 0–1)
BUN SERPL-MCNC: 12 MG/DL (ref 5–25)
BUN SERPL-MCNC: 6 MG/DL (ref 5–25)
CALCIUM SERPL-MCNC: 8.7 MG/DL (ref 8.4–10.2)
CALCIUM SERPL-MCNC: 8.8 MG/DL (ref 8.4–10.2)
CHLORIDE SERPL-SCNC: 104 MMOL/L (ref 96–108)
CHLORIDE SERPL-SCNC: 104 MMOL/L (ref 96–108)
CO2 SERPL-SCNC: 26 MMOL/L (ref 21–32)
CO2 SERPL-SCNC: 27 MMOL/L (ref 21–32)
CREAT SERPL-MCNC: 0.61 MG/DL (ref 0.6–1.3)
CREAT SERPL-MCNC: 0.68 MG/DL (ref 0.6–1.3)
EOSINOPHIL # BLD AUTO: 0.12 THOUSAND/ΜL (ref 0–0.61)
EOSINOPHIL NFR BLD AUTO: 3 % (ref 0–6)
ERYTHROCYTE [DISTWIDTH] IN BLOOD BY AUTOMATED COUNT: 12.8 % (ref 11.6–15.1)
ERYTHROCYTE [DISTWIDTH] IN BLOOD BY AUTOMATED COUNT: 13 % (ref 11.6–15.1)
GFR SERPL CREATININE-BSD FRML MDRD: 86 ML/MIN/1.73SQ M
GFR SERPL CREATININE-BSD FRML MDRD: 89 ML/MIN/1.73SQ M
GLUCOSE SERPL-MCNC: 103 MG/DL (ref 65–140)
GLUCOSE SERPL-MCNC: 125 MG/DL (ref 65–140)
HCT VFR BLD AUTO: 32.2 % (ref 34.8–46.1)
HCT VFR BLD AUTO: 35.9 % (ref 34.8–46.1)
HGB BLD-MCNC: 11.1 G/DL (ref 11.5–15.4)
HGB BLD-MCNC: 12.1 G/DL (ref 11.5–15.4)
IMM GRANULOCYTES # BLD AUTO: 0.01 THOUSAND/UL (ref 0–0.2)
IMM GRANULOCYTES NFR BLD AUTO: 0 % (ref 0–2)
LYMPHOCYTES # BLD AUTO: 1.78 THOUSANDS/ΜL (ref 0.6–4.47)
LYMPHOCYTES NFR BLD AUTO: 36 % (ref 14–44)
MAGNESIUM SERPL-MCNC: 1.8 MG/DL (ref 1.9–2.7)
MCH RBC QN AUTO: 33.2 PG (ref 26.8–34.3)
MCH RBC QN AUTO: 33.3 PG (ref 26.8–34.3)
MCHC RBC AUTO-ENTMCNC: 33.7 G/DL (ref 31.4–37.4)
MCHC RBC AUTO-ENTMCNC: 34.5 G/DL (ref 31.4–37.4)
MCV RBC AUTO: 97 FL (ref 82–98)
MCV RBC AUTO: 99 FL (ref 82–98)
MONOCYTES # BLD AUTO: 0.59 THOUSAND/ΜL (ref 0.17–1.22)
MONOCYTES NFR BLD AUTO: 12 % (ref 4–12)
NEUTROPHILS # BLD AUTO: 2.36 THOUSANDS/ΜL (ref 1.85–7.62)
NEUTS SEG NFR BLD AUTO: 48 % (ref 43–75)
NRBC BLD AUTO-RTO: 0 /100 WBCS
PHOSPHATE SERPL-MCNC: 2.7 MG/DL (ref 2.3–4.1)
PLATELET # BLD AUTO: 187 THOUSANDS/UL (ref 149–390)
PLATELET # BLD AUTO: 196 THOUSANDS/UL (ref 149–390)
PMV BLD AUTO: 9.6 FL (ref 8.9–12.7)
PMV BLD AUTO: 9.9 FL (ref 8.9–12.7)
POTASSIUM SERPL-SCNC: 3.8 MMOL/L (ref 3.5–5.3)
POTASSIUM SERPL-SCNC: 4.3 MMOL/L (ref 3.5–5.3)
RBC # BLD AUTO: 3.33 MILLION/UL (ref 3.81–5.12)
RBC # BLD AUTO: 3.64 MILLION/UL (ref 3.81–5.12)
SODIUM SERPL-SCNC: 135 MMOL/L (ref 135–147)
SODIUM SERPL-SCNC: 136 MMOL/L (ref 135–147)
WBC # BLD AUTO: 4.89 THOUSAND/UL (ref 4.31–10.16)
WBC # BLD AUTO: 5.19 THOUSAND/UL (ref 4.31–10.16)

## 2024-10-08 PROCEDURE — 85025 COMPLETE CBC W/AUTO DIFF WBC: CPT | Performed by: PHYSICIAN ASSISTANT

## 2024-10-08 PROCEDURE — 80048 BASIC METABOLIC PNL TOTAL CA: CPT | Performed by: PHYSICIAN ASSISTANT

## 2024-10-08 PROCEDURE — 80048 BASIC METABOLIC PNL TOTAL CA: CPT | Performed by: NURSE PRACTITIONER

## 2024-10-08 PROCEDURE — 83735 ASSAY OF MAGNESIUM: CPT | Performed by: NURSE PRACTITIONER

## 2024-10-08 PROCEDURE — 85027 COMPLETE CBC AUTOMATED: CPT | Performed by: NURSE PRACTITIONER

## 2024-10-08 PROCEDURE — 84100 ASSAY OF PHOSPHORUS: CPT | Performed by: NURSE PRACTITIONER

## 2024-10-08 PROCEDURE — 99231 SBSQ HOSP IP/OBS SF/LOW 25: CPT | Performed by: SURGERY

## 2024-10-08 RX ORDER — GABAPENTIN 300 MG/1
300 CAPSULE ORAL 3 TIMES DAILY
Status: DISCONTINUED | OUTPATIENT
Start: 2024-10-08 | End: 2024-10-10 | Stop reason: HOSPADM

## 2024-10-08 RX ORDER — METHOCARBAMOL 500 MG/1
500 TABLET, FILM COATED ORAL EVERY 8 HOURS SCHEDULED
Status: DISCONTINUED | OUTPATIENT
Start: 2024-10-08 | End: 2024-10-10 | Stop reason: HOSPADM

## 2024-10-08 RX ORDER — KETOROLAC TROMETHAMINE 30 MG/ML
15 INJECTION, SOLUTION INTRAMUSCULAR; INTRAVENOUS DAILY
Status: DISCONTINUED | OUTPATIENT
Start: 2024-10-08 | End: 2024-10-10

## 2024-10-08 RX ORDER — SODIUM CHLORIDE, SODIUM GLUCONATE, SODIUM ACETATE, POTASSIUM CHLORIDE, MAGNESIUM CHLORIDE, SODIUM PHOSPHATE, DIBASIC, AND POTASSIUM PHOSPHATE .53; .5; .37; .037; .03; .012; .00082 G/100ML; G/100ML; G/100ML; G/100ML; G/100ML; G/100ML; G/100ML
500 INJECTION, SOLUTION INTRAVENOUS ONCE
Status: COMPLETED | OUTPATIENT
Start: 2024-10-08 | End: 2024-10-08

## 2024-10-08 RX ORDER — POLYETHYLENE GLYCOL 3350 17 G/17G
17 POWDER, FOR SOLUTION ORAL DAILY
Status: DISCONTINUED | OUTPATIENT
Start: 2024-10-08 | End: 2024-10-10 | Stop reason: HOSPADM

## 2024-10-08 RX ORDER — LIDOCAINE 50 MG/G
1 PATCH TOPICAL DAILY
Status: DISCONTINUED | OUTPATIENT
Start: 2024-10-08 | End: 2024-10-10 | Stop reason: HOSPADM

## 2024-10-08 RX ADMIN — METHOCARBAMOL 500 MG: 500 TABLET ORAL at 14:42

## 2024-10-08 RX ADMIN — LIDOCAINE 1 PATCH: 50 PATCH CUTANEOUS at 10:41

## 2024-10-08 RX ADMIN — ACETAMINOPHEN 975 MG: 325 TABLET ORAL at 06:12

## 2024-10-08 RX ADMIN — Medication 5000 UNITS: at 14:42

## 2024-10-08 RX ADMIN — ENOXAPARIN SODIUM 30 MG: 30 INJECTION SUBCUTANEOUS at 14:42

## 2024-10-08 RX ADMIN — DOCUSATE SODIUM 100 MG: 100 CAPSULE, LIQUID FILLED ORAL at 08:38

## 2024-10-08 RX ADMIN — SODIUM CHLORIDE, SODIUM GLUCONATE, SODIUM ACETATE, POTASSIUM CHLORIDE, MAGNESIUM CHLORIDE, SODIUM PHOSPHATE, DIBASIC, AND POTASSIUM PHOSPHATE 500 ML: .53; .5; .37; .037; .03; .012; .00082 INJECTION, SOLUTION INTRAVENOUS at 16:16

## 2024-10-08 RX ADMIN — OXYCODONE HYDROCHLORIDE 5 MG: 5 TABLET ORAL at 10:42

## 2024-10-08 RX ADMIN — METHOCARBAMOL 500 MG: 500 TABLET ORAL at 21:29

## 2024-10-08 RX ADMIN — DOCUSATE SODIUM 100 MG: 100 CAPSULE, LIQUID FILLED ORAL at 17:37

## 2024-10-08 RX ADMIN — GABAPENTIN 100 MG: 100 CAPSULE ORAL at 08:38

## 2024-10-08 RX ADMIN — OXYCODONE HYDROCHLORIDE 5 MG: 5 TABLET ORAL at 06:36

## 2024-10-08 RX ADMIN — SENNOSIDES 17.2 MG: 8.6 TABLET, FILM COATED ORAL at 21:29

## 2024-10-08 RX ADMIN — ENOXAPARIN SODIUM 30 MG: 30 INJECTION SUBCUTANEOUS at 03:01

## 2024-10-08 RX ADMIN — METHOCARBAMOL 500 MG: 500 TABLET ORAL at 06:12

## 2024-10-08 RX ADMIN — ACETAMINOPHEN 975 MG: 325 TABLET ORAL at 14:42

## 2024-10-08 RX ADMIN — POLYETHYLENE GLYCOL 3350 17 G: 17 POWDER, FOR SOLUTION ORAL at 11:34

## 2024-10-08 RX ADMIN — KETOROLAC TROMETHAMINE 15 MG: 30 INJECTION, SOLUTION INTRAMUSCULAR; INTRAVENOUS at 11:33

## 2024-10-08 RX ADMIN — GABAPENTIN 300 MG: 300 CAPSULE ORAL at 21:29

## 2024-10-08 RX ADMIN — ACETAMINOPHEN 975 MG: 325 TABLET ORAL at 21:29

## 2024-10-08 NOTE — PLAN OF CARE
Problem: Potential for Falls  Goal: Patient will remain free of falls  Description: INTERVENTIONS:  - Educate patient/family on patient safety including physical limitations  - Instruct patient to call for assistance with activity   - Consult OT/PT to assist with strengthening/mobility   - Keep Call bell within reach  - Keep bed low and locked with side rails adjusted as appropriate  - Keep care items and personal belongings within reach  - Initiate and maintain comfort rounds  - Make Fall Risk Sign visible to staff  - Offer Toileting every 2 Hours, in advance of need  - Initiate/Maintain bed/chair alarm  - Obtain necessary fall risk management equipment:   - Apply yellow socks and bracelet for high fall risk patients  - Consider moving patient to room near nurses station  Outcome: Progressing     Problem: PAIN - ADULT  Goal: Verbalizes/displays adequate comfort level or baseline comfort level  Description: Interventions:  - Encourage patient to monitor pain and request assistance  - Assess pain using appropriate pain scale  - Administer analgesics based on type and severity of pain and evaluate response  - Implement non-pharmacological measures as appropriate and evaluate response  - Consider cultural and social influences on pain and pain management  - Notify physician/advanced practitioner if interventions unsuccessful or patient reports new pain  Outcome: Progressing     Problem: DISCHARGE PLANNING  Goal: Discharge to home or other facility with appropriate resources  Description: INTERVENTIONS:  - Identify barriers to discharge w/patient and caregiver  - Arrange for needed discharge resources and transportation as appropriate  - Identify discharge learning needs (meds, wound care, etc.)  - Arrange for interpretive services to assist at discharge as needed  - Refer to Case Management Department for coordinating discharge planning if the patient needs post-hospital services based on physician/advanced  practitioner order or complex needs related to functional status, cognitive ability, or social support system  Outcome: Progressing     Problem: Knowledge Deficit  Goal: Patient/family/caregiver demonstrates understanding of disease process, treatment plan, medications, and discharge instructions  Description: Complete learning assessment and assess knowledge base.  Interventions:  - Provide teaching at level of understanding  - Provide teaching via preferred learning methods  Outcome: Progressing     Problem: NEUROSENSORY - ADULT  Goal: Achieves stable or improved neurological status  Description: INTERVENTIONS  - Monitor and report changes in neurological status  - Monitor vital signs such as temperature, blood pressure, glucose, and any other labs ordered   - Initiate measures to prevent increased intracranial pressure  - Monitor for seizure activity and implement precautions if appropriate      Outcome: Progressing  Goal: Remains free of injury related to seizures activity  Description: INTERVENTIONS  - Maintain airway, patient safety  and administer oxygen as ordered  - Monitor patient for seizure activity, document and report duration and description of seizure to physician/advanced practitioner  - If seizure occurs,  ensure patient safety during seizure  - Reorient patient post seizure  - Seizure pads on all 4 side rails  - Instruct patient/family to notify RN of any seizure activity including if an aura is experienced  - Instruct patient/family to call for assistance with activity based on nursing assessment  - Administer anti-seizure medications if ordered    Outcome: Progressing  Goal: Achieves maximal functionality and self care  Description: INTERVENTIONS  - Monitor swallowing and airway patency with patient fatigue and changes in neurological status  - Encourage and assist patient to increase activity and self care.   - Encourage visually impaired, hearing impaired and aphasic patients to use  assistive/communication devices  Outcome: Progressing     Problem: MUSCULOSKELETAL - ADULT  Goal: Maintain or return mobility to safest level of function  Description: INTERVENTIONS:  - Assess patient's ability to carry out ADLs; assess patient's baseline for ADL function and identify physical deficits which impact ability to perform ADLs (bathing, care of mouth/teeth, toileting, grooming, dressing, etc.)  - Assess/evaluate cause of self-care deficits   - Assess range of motion  - Assess patient's mobility  - Assess patient's need for assistive devices and provide as appropriate  - Encourage maximum independence but intervene and supervise when necessary  - Involve family in performance of ADLs  - Assess for home care needs following discharge   - Consider OT consult to assist with ADL evaluation and planning for discharge  - Provide patient education as appropriate  Outcome: Progressing  Goal: Maintain proper alignment of affected body part  Description: INTERVENTIONS:  - Support, maintain and protect limb and body alignment  - Provide patient/ family with appropriate education  Outcome: Progressing     Problem: Nutrition/Hydration-ADULT  Goal: Nutrient/Hydration intake appropriate for improving, restoring or maintaining nutritional needs  Description: Monitor and assess patient's nutrition/hydration status for malnutrition. Collaborate with interdisciplinary team and initiate plan and interventions as ordered.  Monitor patient's weight and dietary intake as ordered or per policy. Utilize nutrition screening tool and intervene as necessary. Determine patient's food preferences and provide high-protein, high-caloric foods as appropriate.     INTERVENTIONS:  - Monitor oral intake, urinary output, labs, and treatment plans  - Assess nutrition and hydration status and recommend course of action  - Evaluate amount of meals eaten  - Assist patient with eating if necessary   - Allow adequate time for meals  - Recommend/  encourage appropriate diets, oral nutritional supplements, and vitamin/mineral supplements  - Order, calculate, and assess calorie counts as needed  - Recommend, monitor, and adjust tube feedings and TPN/PPN based on assessed needs  - Assess need for intravenous fluids  - Provide specific nutrition/hydration education as appropriate  - Include patient/family/caregiver in decisions related to nutrition  Outcome: Progressing     Problem: Prexisting or High Potential for Compromised Skin Integrity  Goal: Skin integrity is maintained or improved  Description: INTERVENTIONS:  - Identify patients at risk for skin breakdown  - Assess and monitor skin integrity  - Assess and monitor nutrition and hydration status  - Monitor labs   - Assess for incontinence   - Turn and reposition patient  - Assist with mobility/ambulation  - Relieve pressure over bony prominences  - Avoid friction and shearing  - Provide appropriate hygiene as needed including keeping skin clean and dry  - Evaluate need for skin moisturizer/barrier cream  - Collaborate with interdisciplinary team   - Patient/family teaching  - Consider wound care consult   Outcome: Progressing

## 2024-10-08 NOTE — CASE MANAGEMENT
[place] [facility] received request for authorization from Care Manager.  Authorization request submitted for: Acute Rehab  Facility Name:[facility]   NPI: [id number]  Facility MD:  Dr De Padua     NPI: [id number]  Authorization initiated by contacting insurance:  [employer]  Via: [employer] Portal   Clinicals submitted via [employer] attachment   Pending Reference #:[id number]    Care Manager notified: Will Sawyer     Updates to authorization status will be noted in chart. Please reach out to CM for updates on any clinical information.

## 2024-10-08 NOTE — CASE MANAGEMENT
Case Management Discharge Planning Note    Patient name Catherine Lemon  Location [facility] 619/[facility] 619-01 MRN [id number]  : 1950 Date 10/8/2024       Current Admission Date: 10/6/2024  Current Admission Diagnosis:Closed fracture of pelvis (HCC)   Patient Active Problem List    Diagnosis Date Noted Date Diagnosed    Fall 10/07/2024     Abnormal CT scan, pelvis 10/07/2024     Osteoporosis with current pathological fracture 10/07/2024     Closed fracture of pelvis (HCC) 10/06/2024       LOS (days): 2  Geometric Mean LOS (GMLOS) (days): 2.8  Days to GMLOS:1     OBJECTIVE:  Risk of Unplanned Readmission Score: 6.69         Current admission status: Inpatient   Preferred Pharmacy:   [facility] [place], [place] - [address]  [address]  [facility] [address]  Phone: [phone] Fax: [phone]    Primary Care Provider: Douglas Charles Shoenberger, MD    Primary Insurance: [employer]  Secondary Insurance:     DISCHARGE DETAILS:    Auth submitted to insurance for [facility]

## 2024-10-08 NOTE — CASE MANAGEMENT
Support Center has received PEER TO PEER request prior to determination being made.   Received for Acute Rehab Authorization.   Insurance: [employer]   Received via fax  Facility: [facility]    Pending Auth #:[id number]   Peer to Peer Phone#: [phone]      Deadline:10/09 5:00  Information sent by DCS to [place] group to complete P2P.     Please notify Discharge Support if P2P will not be completed.     Care Manager notified:Will Sawyer     Please reach out to CM for updates on any clinical information.

## 2024-10-08 NOTE — PHYSICIAN ADVISOR
JANE with [employer] for [facility] peer to peer. They will likely return my call tomorrow. I requested no later than tomorrow for peer review.    Abi Brice, DO  Physician Advisor

## 2024-10-08 NOTE — [facility]C ADMISSION
[facility]  met with patient at bedside. Reviewed [facility] program, acute rehab criteria and approval process, insurance authorization process, as well as [facility] locations and preferences. Patient's preferred [facility] location is [facility]. [facility] Rehab folder left with patient and all questions answered. Patient was made aware that [facility] Reviewer will keep their  updated regarding referral status.

## 2024-10-08 NOTE — PHYSICIAN ADVISOR
Yung returned call. The earliest slot they can give me is in the afternoon on 10/9. I will update CM and DCS after the peer review call tomorrow.

## 2024-10-08 NOTE — ASSESSMENT & PLAN NOTE
Tripped and fell while walking her dog on 10/5  Pain in RLE and pelvis with walking and when lying still so came to ED on 10/6  10/6 CT pelvis - nondisplaced crush fracture of right sacrum, nondisplaced fractures of the right interior and superior pubic rami. Posterior disc extrusion at L5-S1 that extendins into left neural foramen with marked left sided neural foraminal narrowing   Orthopedics consulted, appreciate recommendations  10/7 - repeat pelvis XR stable anatomical alignment  DVT ppx ASA 81 BID  PT/OT  WBAT BL LE

## 2024-10-08 NOTE — PROGRESS NOTES
Progress Note - Trauma   Name: Catherine Lemon 74 y.o. female I MRN: [id number]  [facility] I Date of Admission: 10/6/2024   Date of Service: 10/8/2024 I Hospital Day: 2    Assessment & Plan  Closed fracture of pelvis (HCC)  Tripped and fell while walking her dog on 10/5  Pain in RLE and pelvis with walking and when lying still so came to ED on 10/6  10/6 CT pelvis - nondisplaced crush fracture of right sacrum, nondisplaced fractures of the right interior and superior pubic rami. Posterior disc extrusion at L5-S1 that extendins into left neural foramen with marked left sided neural foraminal narrowing   Orthopedics consulted, appreciate recommendations  10/7 - repeat pelvis XR stable anatomical alignment  DVT ppx ASA 81 BID  PT/OT  WBAT BL LE  Fall  Fall while walking her small dog 10/5  Pain in RLE and pelvis with ambulation and when lying still  Unable to get up so came to ER 10/6  CT lumbar spine - no acute compression of vertebral, right sacral fracture  Abnormal CT scan, pelvis    Osteoporosis with current pathological fracture      Bowel Regimen: colace senna  VTE Prophylaxis:Enoxaparin (Lovenox)     Disposition: Accepted to [facility], submitting ins auth I have discussed the above management plan in detail with the primary service.     24 Hour Events : Patient admitted to the trauma team 2 days prior after a fall at which time she was found to have a sacral fracture and inf/sup CT fractures.   Subjective : Patient still having significant pain. Will review pain medications and optimize today, PT/OT recommending Rehab. [facility] pending. Stable medically.    Objective :  Temp:  [98 °F (36.7 °C)-98.4 °F (36.9 °C)] 98.4 °F (36.9 °C)  HR:  [69-82] 69  BP: (109-141)/(57-83) 116/68  Resp:  [16-21] 17  SpO2:  [95 %-98 %] 95 %  O2 Device: None (Room air)    I/O         10/06 0701  10/07 0700 10/07 0701  10/08 0700 10/08 0701  10/09 0700    P.O.  360     Total Intake(mL/kg)  360 (5.6)     Urine (mL/kg/hr)  1000 (0.7)      Total Output  1000     Net  -640                  Lines/Drains/Airways       Active Status       Name Placement date Placement time Site Days    External Urinary Catheter --  --  -- --                  Physical Exam  Constitutional:       General: She is not in acute distress.     Appearance: She is not toxic-appearing.   HENT:      Head: Atraumatic.   Eyes:      Pupils: Pupils are equal, round, and reactive to light.   Cardiovascular:      Rate and Rhythm: Normal rate.      Pulses: Normal pulses.      Heart sounds: Normal heart sounds. No murmur heard.     No gallop.   Pulmonary:      Effort: Pulmonary effort is normal. No respiratory distress.      Breath sounds: Normal breath sounds. No wheezing or rales.   Abdominal:      General: Bowel sounds are normal. There is no distension.      Palpations: Abdomen is soft.      Tenderness: There is no abdominal tenderness. There is no guarding.   Musculoskeletal:         General: No swelling or deformity. Normal range of motion.      Cervical back: Normal range of motion.   Skin:     General: Skin is warm.      Capillary Refill: Capillary refill takes less than 2 seconds.   Neurological:      General: No focal deficit present.      Mental Status: She is alert and oriented to person, place, and time.   Psychiatric:         Behavior: Behavior normal.               Lab Results: I have reviewed the following results:  Recent Labs     10/06/24  1753 10/06/24  1754   PLT  --  187   SODIUM 137  --    K 4.1  --      --    CO2 26  --    BUN 8  --    CREATININE 0.49*  --    GLUC 117  --    AST 21  --    ALT 20  --    ALB 4.0  --    TBILI 0.83  --    ALKPHOS 61  --        Imaging Results Review: I reviewed radiology reports from this admission including: hip and CT Pelvis.  Other Study Results Review: No additional pertinent studies reviewed.

## 2024-10-08 NOTE — UTILIZATION REVIEW
Date:   10/8    Day 3: Has surpassed a 2nd midnight with active treatments and services.    Still with significant pain.    Oxycodone  X2  thus far today.     Need to optimize pain meds.     Continue pain control as needed.   Needs PT/OT, rehab recommended at  d/c.   Continue current meds/treatment plan.

## 2024-10-09 LAB
ANION GAP SERPL CALCULATED.3IONS-SCNC: 7 MMOL/L (ref 4–13)
BUN SERPL-MCNC: 10 MG/DL (ref 5–25)
CALCIUM SERPL-MCNC: 8.8 MG/DL (ref 8.4–10.2)
CHLORIDE SERPL-SCNC: 104 MMOL/L (ref 96–108)
CO2 SERPL-SCNC: 26 MMOL/L (ref 21–32)
CREAT SERPL-MCNC: 0.57 MG/DL (ref 0.6–1.3)
GFR SERPL CREATININE-BSD FRML MDRD: 91 ML/MIN/1.73SQ M
GLUCOSE SERPL-MCNC: 99 MG/DL (ref 65–140)
POTASSIUM SERPL-SCNC: 4.3 MMOL/L (ref 3.5–5.3)
SODIUM SERPL-SCNC: 137 MMOL/L (ref 135–147)

## 2024-10-09 PROCEDURE — 80048 BASIC METABOLIC PNL TOTAL CA: CPT

## 2024-10-09 PROCEDURE — 97530 THERAPEUTIC ACTIVITIES: CPT

## 2024-10-09 PROCEDURE — 97116 GAIT TRAINING THERAPY: CPT

## 2024-10-09 PROCEDURE — 99231 SBSQ HOSP IP/OBS SF/LOW 25: CPT | Performed by: SURGERY

## 2024-10-09 PROCEDURE — 97535 SELF CARE MNGMENT TRAINING: CPT

## 2024-10-09 RX ADMIN — GABAPENTIN 300 MG: 300 CAPSULE ORAL at 09:33

## 2024-10-09 RX ADMIN — ENOXAPARIN SODIUM 30 MG: 30 INJECTION SUBCUTANEOUS at 15:04

## 2024-10-09 RX ADMIN — METHOCARBAMOL 500 MG: 500 TABLET ORAL at 05:11

## 2024-10-09 RX ADMIN — SENNOSIDES 17.2 MG: 8.6 TABLET, FILM COATED ORAL at 21:38

## 2024-10-09 RX ADMIN — Medication 5000 UNITS: at 09:33

## 2024-10-09 RX ADMIN — OXYCODONE HYDROCHLORIDE 5 MG: 5 TABLET ORAL at 05:12

## 2024-10-09 RX ADMIN — METHOCARBAMOL 500 MG: 500 TABLET ORAL at 15:04

## 2024-10-09 RX ADMIN — ACETAMINOPHEN 975 MG: 325 TABLET ORAL at 21:38

## 2024-10-09 RX ADMIN — GABAPENTIN 300 MG: 300 CAPSULE ORAL at 17:41

## 2024-10-09 RX ADMIN — GABAPENTIN 300 MG: 300 CAPSULE ORAL at 21:38

## 2024-10-09 RX ADMIN — METHOCARBAMOL 500 MG: 500 TABLET ORAL at 21:38

## 2024-10-09 RX ADMIN — KETOROLAC TROMETHAMINE 15 MG: 30 INJECTION, SOLUTION INTRAMUSCULAR; INTRAVENOUS at 09:34

## 2024-10-09 RX ADMIN — LIDOCAINE 1 PATCH: 50 PATCH CUTANEOUS at 09:37

## 2024-10-09 RX ADMIN — DOCUSATE SODIUM 100 MG: 100 CAPSULE, LIQUID FILLED ORAL at 09:34

## 2024-10-09 RX ADMIN — ENOXAPARIN SODIUM 30 MG: 30 INJECTION SUBCUTANEOUS at 05:11

## 2024-10-09 RX ADMIN — ACETAMINOPHEN 975 MG: 325 TABLET ORAL at 05:11

## 2024-10-09 NOTE — CASE MANAGEMENT
Case Management Discharge Planning Note    Patient name Catherine Lemon  Location [facility] 619/[facility] 619-01 MRN [id number]  : 1950 Date 10/9/2024       Current Admission Date: 10/6/2024  Current Admission Diagnosis:Closed fracture of pelvis (HCC)   Patient Active Problem List    Diagnosis Date Noted Date Diagnosed    Fall 10/07/2024     Abnormal CT scan, pelvis 10/07/2024     Osteoporosis with current pathological fracture 10/07/2024     Closed fracture of pelvis (HCC) 10/06/2024       LOS (days): 3  Geometric Mean LOS (GMLOS) (days): 2.8  Days to GMLOS:0     OBJECTIVE:  Risk of Unplanned Readmission Score: 6.35         Current admission status: Inpatient   Preferred Pharmacy:   [facility] [address] [facility] [address]  [address] [address]  [facility] [address]  Phone: [phone] Fax: [phone]    Primary Care Provider: Douglas Charles Shoenberger, MD    Primary Insurance: [employer] REP  Secondary Insurance:     DISCHARGE DETAILS:    Peer to Peer to be completed today in the afternoon, as per Physician Advisor team. CM will speak to therapy and have them see the pt prior to this conversation is had

## 2024-10-09 NOTE — CASE MANAGEMENT
[facility] received request for authorization from Care Manager.  Authorization request submitted for: SNF  Facility Name:  [facility] NPI: [id number]  Facility MD: Ed Jones NPI: [id number]  Authorization initiated by contacting insurance: [employer]   Via: [employer]   Clinicals submitted via Portal attachment   Pending Reference #: [id number]     Care Manager notified: Gwyn Jacques     Updates to authorization status will be noted in chart. Please reach out to CM for updates on any clinical information.

## 2024-10-09 NOTE — OCCUPATIONAL THERAPY NOTE
Occupational Therapy Treatment Note:      10/09/24 1140   OT Last Visit   OT Visit Date 10/09/24   Note Type   Note Type Treatment   Pain Assessment   Pain Assessment Tool FLACC   Pain Rating: FLACC (Rest) - Face 0   Pain Rating: FLACC (Rest) - Legs 0   Pain Rating: FLACC (Rest) - Activity 0   Pain Rating: FLACC (Rest) - Cry 0   Pain Rating: FLACC (Rest) - Consolability 0   Score: FLACC (Rest) 0   Pain Rating: FLACC (Activity) - Face 1   Pain Rating: FLACC (Activity) - Legs 1   Pain Rating: FLACC (Activity) - Activity 1   Pain Rating: FLACC (Activity) - Cry 0   Pain Rating: FLACC (Activity) - Consolability 0   Score: FLACC (Activity) 3   Restrictions/Precautions   RLE Weight Bearing Per Order WBAT   LLE Weight Bearing Per Order WBAT   Other Precautions Fall Risk;Pain   ADL   Where Assessed Edge of bed   Grooming Assistance 5  Supervision/Setup   UB Dressing Assistance 5  Supervision/Setup   LB Dressing Assistance 3  Moderate Assistance   Toileting Assistance  3  Moderate Assistance   Functional Standing Tolerance   Time fair balance c rw   Bed Mobility   Supine to Sit 5  Supervision   Transfers   Sit to Stand 4  Minimal assistance   Stand to Sit 4  Minimal assistance   Functional Mobility   Functional Mobility 4  Minimal assistance   Additional items Rolling walker   Cognition   Overall Cognitive Status WFL   Assessment   Assessment pt participated in am ot session and was seen focusing on adls ub and lb. pt can reaching feet with less pain when in supine. in sitting she requires mod asst. mod asst for clothing management as well. sba ub adls / grooming. pt walked with rw with much incrased time, she currently requires asst for iadls and homemaking and therefore requires in pt rehab inorder to increase strength and balance for adls/ iadls.   Plan   Treatment Interventions ADL retraining;Functional transfer training;Endurance training;Patient/family training;Equipment evaluation/education;Activityengagement   Goal  Expiration Date 10/21/24   OT Treatment Day 1   OT Frequency 3-5x/wk   Discharge Recommendation   Rehab Resource Intensity Level, OT I (Maximum Resource Intensity)     April A Storm

## 2024-10-09 NOTE — CASE MANAGEMENT
Case Management Discharge Planning Note    Patient name Catherine Lemon  Location [facility] 619/[facility] 619-01 MRN [id number]  : 1950 Date 10/9/2024       Current Admission Date: 10/6/2024  Current Admission Diagnosis:Closed fracture of pelvis (HCC)   Patient Active Problem List    Diagnosis Date Noted Date Diagnosed    Fall 10/07/2024     Abnormal CT scan, pelvis 10/07/2024     Osteoporosis with current pathological fracture 10/07/2024     Closed fracture of pelvis (HCC) 10/06/2024       LOS (days): 3  Geometric Mean LOS (GMLOS) (days): 2.6  Days to GMLOS:-0.4     OBJECTIVE:  Risk of Unplanned Readmission Score: 6.36         Current admission status: Inpatient   Preferred Pharmacy:   [facility] [address] [place], [place] [address] [address] [facility]  [address] [facility] [address]  [facility] [address]  Phone: [phone] Fax: [phone]    Primary Care Provider: Douglas Charles Shoenberger, MD    Primary Insurance: [employer] REP  Secondary Insurance:     DISCHARGE DETAILS:    Pt's peer to peer completed and was upheld.  CM discussed with pt and she is in agreement to referral to  TCF   They can accept  CM will submit for auth

## 2024-10-09 NOTE — [facility] ADMISSION
Patient's insurance denied acute rehab.  Per CM, an expedited appeal will not be completed.  [facility] referral closed.

## 2024-10-09 NOTE — PLAN OF CARE
Problem: PHYSICAL THERAPY ADULT  Goal: Performs mobility at highest level of function for planned discharge setting.  See evaluation for individualized goals.  Description: Treatment/Interventions: Functional transfer training, LE strengthening/ROM, Elevations, Therapeutic exercise, Endurance training, Patient/family training, Equipment eval/education, Bed mobility, Gait training, Spoke to nursing, OT  Equipment Recommended: Walker       See flowsheet documentation for full assessment, interventions and recommendations.  Outcome: Progressing  Note: Prognosis: Good  Problem List: Decreased strength, Decreased range of motion, Decreased endurance, Decreased mobility, Pain, Orthopedic restrictions  Assessment: Pt demonstrated considerable improvement in all aspects of mobilty this session as a result of improed pain control & attention to sequencing of tasks that were instructed to her today in effort to reduce srain over injury sites & allow her to regain functional mobiilty & progress to short distance gait trials.  She demonstrated improved pace & RLE mobilty & weight bearing by conclusion of task, but does continue to require excesive time to complate all tasks to compensate for current deficits.  She remains RW reliant & was unable to achieve household distances which are both well below baseline independence where pt ambulates independently unlimited distances prior to admit.  Anticipate she will continue to make rapid recovery as pain resides & RLE mobility continues to improve.  PT POC & d/c recommendations remain appropriate.  Pt in agreement to the same post session.        Rehab Resource Intensity Level, PT: I (Maximum Resource Intensity)    See flowsheet documentation for full assessment.

## 2024-10-09 NOTE — PROGRESS NOTES
Patient:    MRN:  [id number]    Aidin Request ID:  [id number]    Level of care reserved:  Inpatient Rehab Facility    Partner Reserved:  [facility] ([place]), [place] [address] [phone]    Clinical needs requested:    Geography searched:  10 miles around [address]    Start of Service:    Request sent:  3:26pm EDT on 10/7/2024 by Gwyn Jacques    Partner reserved:  3:57pm EDT on 10/7/2024 by Gwyn Jacques    Choice list shared:  3:57pm EDT on 10/7/2024 by Gwyn Jacques

## 2024-10-09 NOTE — CASE MANAGEMENT
Support Center has received DENIAL for Acute Rehab Authorization.   Insurance: [employer]   Received via fax  Denial Reason: not meet criteria   Facility: [facility]   Denial #: [id number]   Appeal P# [phone]  /   Appeal F# [phone]    Please notify Discharge Support if P2P will not be completed.     Care Manager notified: Will Sawyer     Please reach out to CM for updates on any clinical information.    DISCHARGE

## 2024-10-09 NOTE — CASE MANAGEMENT
CM received notification from Physician Advisor:  Abi Brice      P2P completed. Denial Upheld   Level of Care: Acute Rehab  Rationale for denial upheld: not meet criteria for complex medical needs   Insurer:  [employer]   Insurer's Medical Director:Dr Smiley   Date outcome received: 10/09  Facility: [facility]     Care Manager notified: Familia Jacques

## 2024-10-09 NOTE — PROGRESS NOTES
Progress Note - Trauma   Name: Catherine Lemon 74 y.o. female I MRN: [id number]  [facility] Date of Admission: 10/6/2024   Date of Service: 10/9/2024 I Hospital Day: 3    Assessment & Plan  Closed fracture of pelvis (HCC)  Tripped and fell while walking her dog on 10/5  Pain in RLE and pelvis with walking and when lying still so came to ED on 10/6  10/6 CT pelvis - nondisplaced crush fracture of right sacrum, nondisplaced fractures of the right interior and superior pubic rami. Posterior disc extrusion at L5-S1 that extendins into left neural foramen with marked left sided neural foraminal narrowing   Orthopedics consulted, appreciate recommendations  10/7 - repeat pelvis XR stable anatomical alignment  DVT ppx ASA 81 BID  PT/OT  WBAT BL LE  Fall  Fall while walking her small dog 10/5  Pain in RLE and pelvis with ambulation and when lying still  Unable to get up so came to ER 10/6  CT lumbar spine - no acute compression of vertebral, right sacral fracture    Bowel Regimen: Colace, Miralax, senna  VTE Prophylaxis:Enoxaparin (Lovenox)     Disposition: Continue current level of care. Peer to peer denied for acute rehab placement. Pending rehab placement. CM following for disposition planning.    Please contact the SecureChat role for the Trauma service with any questions/concerns.    24 Hour Events : Patient doing well, no acute events overnight.     Subjective : Patient reports she is feeling well this morning.  Her pain is well-controlled at rest and with current pain regimen.  She is resting comfortably in the bed at this time.  She is motivated for discharge to rehab.    Objective :  Temp:  [98 °F (36.7 °C)-98.3 °F (36.8 °C)] 98.1 °F (36.7 °C)  HR:  [64-74] 70  BP: ()/(42-68) 106/61  Resp:  [16-18] 18  SpO2:  [94 %-97 %] 96 %  O2 Device: None (Room air)    I/O         10/07 0701  10/08 0700 10/08 0701  10/09 0700 10/09 0701  10/10 0700    P.O. 360 600     Total Intake(mL/kg) 360 (5.6) 600 (9.4)      Urine (mL/kg/hr) 1000 (0.7) 800 (0.5)     Stool  0     Total Output 1000 800     Net -640 -200            Unmeasured Urine Occurrence  1 x     Unmeasured Stool Occurrence  1 x           Lines/Drains/Airways       Active Status       Name Placement date Placement time Site Days    External Urinary Catheter --  --  -- --                  Physical Exam  Vitals and nursing note reviewed.   Constitutional:       General: She is not in acute distress.     Appearance: She is well-developed. She is not ill-appearing.   HENT:      Head: Normocephalic and atraumatic.   Eyes:      Conjunctiva/sclera: Conjunctivae normal.   Cardiovascular:      Rate and Rhythm: Normal rate and regular rhythm.      Heart sounds: No murmur heard.  Pulmonary:      Effort: Pulmonary effort is normal. No respiratory distress.      Breath sounds: Normal breath sounds.   Chest:      Chest wall: No tenderness.   Abdominal:      General: Abdomen is flat. There is no distension.      Palpations: Abdomen is soft.      Tenderness: There is no abdominal tenderness. There is no guarding.   Musculoskeletal:         General: Tenderness (Bilateral hips) present. No swelling.      Cervical back: Normal range of motion and neck supple. No rigidity or tenderness.   Skin:     General: Skin is warm and dry.      Capillary Refill: Capillary refill takes less than 2 seconds.   Neurological:      General: No focal deficit present.      Mental Status: She is alert and oriented to person, place, and time. Mental status is at baseline.      Sensory: No sensory deficit.      Motor: No weakness.   Psychiatric:         Mood and Affect: Mood normal.         Behavior: Behavior normal.              Lab Results: I have reviewed the following results:  Recent Labs     10/06/24  1753 10/06/24  1754 10/08/24  0838 10/08/24  1629 10/09/24  0503   WBC  --    < > 5.19 4.89  --    HGB  --    < > 12.1 11.1*  --    HCT  --    < > 35.9 32.2*  --    PLT  --    < > 187 196  --    SODIUM  137  --  136 135 137   K 4.1  --  3.8 4.3 4.3     --  104 104 104   CO2 26  --  27 26 26   BUN 8  --  6 12 10   CREATININE 0.49*  --  0.61 0.68 0.57*   GLUC 117  --  125 103 99   MG  --   --  1.8*  --   --    PHOS  --   --  2.7  --   --    AST 21  --   --   --   --    ALT 20  --   --   --   --    ALB 4.0  --   --   --   --    TBILI 0.83  --   --   --   --    ALKPHOS 61  --   --   --   --     < > = values in this interval not displayed.       Imaging Results Review: I reviewed radiology reports from this admission including: CT abdomen/pelvis and xray(s).  Other Study Results Review: No additional pertinent studies reviewed.

## 2024-10-09 NOTE — PLAN OF CARE
Problem: OCCUPATIONAL THERAPY ADULT  Goal: Performs self-care activities at highest level of function for planned discharge setting.  See evaluation for individualized goals.  Description: Treatment Interventions: ADL retraining, Functional transfer training, Endurance training, Patient/family training, Equipment evaluation/education, Compensatory technique education, Energy conservation, Activityengagement          See flowsheet documentation for full assessment, interventions and recommendations.   Outcome: Progressing  Note: Limitation: Decreased ADL status, Decreased Safe judgement during ADL, Decreased endurance, Decreased self-care trans, Decreased high-level ADLs  Prognosis: Good  Assessment: pt participated in am ot session and was seen focusing on adls ub and lb. pt can reaching feet with less pain when in supine. in sitting she requires mod asst. mod asst for clothing management as well. sba ub adls / grooming. pt walked with rw with much incrased time, she currently requires asst for iadls and homemaking and therefore requires in pt rehab inorder to increase strength and balance for adls/ iadls.     Rehab Resource Intensity Level, OT: I (Maximum Resource Intensity)        April A Storm

## 2024-10-09 NOTE — DISCHARGE INSTR - AVS FIRST PAGE
Discharge Instructions - Orthopedics  Catherine Lemon 74 y.o. female MRN: [id number]  Unit/Bed#: [facility] 619-01    Weight Bearing Status:                                           Weightbearing as tolerated to bilateral lower extremities.    DVT prophylaxis:  Aspirin 81 mg twice a day for 28 days from date of injury.    Pain:  Continue analgesics as directed.    Dressing Instructions:   Please keep clean, dry and intact until follow up     Appt Instructions:   If you do not have your appointment, please call the clinic at [phone].  Otherwise follow up as scheduled.    Contact the office sooner if you experience any increased numbness/tingling in the extremities.         not applicable (Male)

## 2024-10-09 NOTE — PROGRESS NOTES
Patient:    MRN:  [id number]    Nena Request ID:  [id number]    Level of care reserved:  Skilled Nursing Facility    Partner Reserved:  [facility], [place] [id number] [phone]    Clinical needs requested:    Geography searched:  10 miles around [address]    Start of Service:    Request sent:  2:17pm EDT on 10/9/2024 by Gwyn Jacques    Partner reserved:  2:50pm EDT on 10/9/2024 by Gwyn Jacques    Choice list shared:  2:50pm EDT on 10/9/2024 by Gwyn Jacques

## 2024-10-09 NOTE — PHYSICAL THERAPY NOTE
Physical Therapy Progress Note     10/09/24 1145   PT Last Visit   PT Visit Date 10/09/24   Note Type   Note Type Treatment for insurance authorization   Pain Assessment   Pain Assessment Tool FLACC   Pain Rating: FLACC (Rest) - Face 1   Pain Rating: FLACC (Rest) - Legs 0   Pain Rating: FLACC (Rest) - Activity 0   Pain Rating: FLACC (Rest) - Cry 1   Pain Rating: FLACC (Rest) - Consolability 0   Score: FLACC (Rest) 2   Pain Rating: FLACC (Activity) - Face 1   Pain Rating: FLACC (Activity) - Legs 1   Pain Rating: FLACC (Activity) - Activity 1   Pain Rating: FLACC (Activity) - Cry 1   Pain Rating: FLACC (Activity) - Consolability 0   Score: FLACC (Activity) 4   Restrictions/Precautions   RLE Weight Bearing Per Order WBAT   LLE Weight Bearing Per Order WBAT   Other Precautions WBS;Fall Risk;Pain   Subjective   Subjective Pt encountered supine in bed, pleasant and motivated to participate in PT today, but was initially anxious regarding expected pain in RLE while walking.  She reports significant improvement in this aspect during session & was pleasantly surprised by her progress as a result.  No other complaints offered when prompted.   Bed Mobility   Supine to Sit 5  Supervision   Additional items Assist x 1;Bedrails;HOB elevated;Increased time required   Transfers   Sit to Stand 4  Minimal assistance   Additional items Assist x 1;Armrests;Increased time required   Stand to Sit 4  Minimal assistance   Additional items Assist x 1;Armrests;Increased time required   Ambulation/Elevation   Gait pattern Step to;Short stride;Foward flexed;Decreased R stance;Decreased foot clearance;Shuffling;Narrow ENEIDA;Antalgic;Improper Weight shift   Gait Assistance 4  Minimal assist   Additional items Assist x 1  (+ chair follow)   Assistive Device Rolling walker   Distance 25'   Balance   Static Sitting Good   Static Standing Fair   Ambulatory Fair -   Activity Tolerance   Activity Tolerance Patient tolerated treatment well;Patient  limited by fatigue;Patient limited by pain   Nurse Made Aware yes   Assessment   Prognosis Good   Problem List Decreased strength;Decreased range of motion;Decreased endurance;Decreased mobility;Pain;Orthopedic restrictions   Assessment Pt demonstrated considerable improvement in all aspects of mobilty this session as a result of improed pain control & attention to sequencing of tasks that were instructed to her today in effort to reduce srain over injury sites & allow her to regain functional mobiilty & progress to short distance gait trials.  She demonstrated improved pace & RLE mobilty & weight bearing by conclusion of task, but does continue to require excesive time to complate all tasks to compensate for current deficits.  She remains RW reliant & was unable to achieve household distances which are both well below baseline independence where pt ambulates independently unlimited distances prior to admit.  Anticipate she will continue to make rapid recovery as pain resides & RLE mobility continues to improve.  PT POC & d/c recommendations remain appropriate.  Pt in agreement to the same post session.   Goals   Patient Goals to get better & go home   STG Expiration Date 10/21/24   PT Treatment Day 1   Plan   Treatment/Interventions LE strengthening/ROM;Functional transfer training;Elevations;Therapeutic exercise;Endurance training;Patient/family training;Equipment eval/education;Bed mobility;Gait training   Progress Progressing toward goals   PT Frequency 3-5x/wk   Discharge Recommendation   Rehab Resource Intensity Level, PT I (Maximum Resource Intensity)   Equipment Recommended Walker   Walker Package Recommended Wheeled walker   AM-PAC Basic Mobility Inpatient   Turning in Flat Bed Without Bedrails 3   Lying on Back to Sitting on Edge of Flat Bed Without Bedrails 3   Moving Bed to Chair 3   Standing Up From Chair Using Arms 3   Walk in Room 3   Climb 3-5 Stairs With Railing 2   Basic Mobility Inpatient Raw  Score 17   Basic Mobility Standardized Score 39.67   [facility] Highest Level Of Mobility   -HL Goal 5: Stand one or more mins   -HLM Achieved 7: Walk 25 feet or more       Negro Elena PTA    An [facility] Basic Mobility Raw Score less than 17 suggests pt would benefit from post acute rehab.  Please also refer to the recommendation of the Physical Therapist for safe discharge planning.

## 2024-10-10 VITALS
SYSTOLIC BLOOD PRESSURE: 111 MMHG | HEIGHT: 67 IN | RESPIRATION RATE: 18 BRPM | DIASTOLIC BLOOD PRESSURE: 64 MMHG | OXYGEN SATURATION: 99 % | TEMPERATURE: 97.8 F | HEART RATE: 75 BPM | BODY MASS INDEX: 22.13 KG/M2 | WEIGHT: 141 LBS

## 2024-10-10 LAB
ANION GAP SERPL CALCULATED.3IONS-SCNC: 4 MMOL/L (ref 4–13)
BUN SERPL-MCNC: 8 MG/DL (ref 5–25)
CALCIUM SERPL-MCNC: 8.7 MG/DL (ref 8.4–10.2)
CHLORIDE SERPL-SCNC: 105 MMOL/L (ref 96–108)
CO2 SERPL-SCNC: 28 MMOL/L (ref 21–32)
CREAT SERPL-MCNC: 0.56 MG/DL (ref 0.6–1.3)
GFR SERPL CREATININE-BSD FRML MDRD: 92 ML/MIN/1.73SQ M
GLUCOSE SERPL-MCNC: 98 MG/DL (ref 65–140)
POTASSIUM SERPL-SCNC: 4.1 MMOL/L (ref 3.5–5.3)
SARS-COV-2 RNA RESP QL NAA+PROBE: NEGATIVE
SODIUM SERPL-SCNC: 137 MMOL/L (ref 135–147)

## 2024-10-10 PROCEDURE — 80048 BASIC METABOLIC PNL TOTAL CA: CPT

## 2024-10-10 PROCEDURE — 99238 HOSP IP/OBS DSCHRG MGMT 30/<: CPT | Performed by: PHYSICIAN ASSISTANT

## 2024-10-10 PROCEDURE — NC001 PR NO CHARGE: Performed by: PHYSICIAN ASSISTANT

## 2024-10-10 PROCEDURE — 87635 SARS-COV-2 COVID-19 AMP PRB: CPT | Performed by: PHYSICIAN ASSISTANT

## 2024-10-10 RX ORDER — CALCIUM CARBONATE 500(1250)
2 TABLET ORAL
Status: DISCONTINUED | OUTPATIENT
Start: 2024-10-11 | End: 2024-10-10 | Stop reason: HOSPADM

## 2024-10-10 RX ORDER — GABAPENTIN 300 MG/1
300 CAPSULE ORAL 3 TIMES DAILY
Qty: 60 CAPSULE | Refills: 0 | Status: ON HOLD | OUTPATIENT
Start: 2024-10-10 | End: 2024-10-17 | Stop reason: ALTCHOICE

## 2024-10-10 RX ORDER — CALCIUM CARBONATE 500(1250)
2 TABLET ORAL
Qty: 60 TABLET | Refills: 0 | Status: ON HOLD | OUTPATIENT
Start: 2024-10-11 | End: 2024-10-17 | Stop reason: SDUPTHER

## 2024-10-10 RX ORDER — METHOCARBAMOL 500 MG/1
500 TABLET, FILM COATED ORAL EVERY 8 HOURS SCHEDULED
Qty: 45 TABLET | Refills: 0 | Status: ON HOLD | OUTPATIENT
Start: 2024-10-10 | End: 2024-10-17 | Stop reason: SDUPTHER

## 2024-10-10 RX ORDER — SENNOSIDES 8.6 MG
17.2 TABLET ORAL
Qty: 30 TABLET | Refills: 0 | Status: SHIPPED | OUTPATIENT
Start: 2024-10-10

## 2024-10-10 RX ORDER — POLYETHYLENE GLYCOL 3350 17 G/17G
17 POWDER, FOR SOLUTION ORAL DAILY
Qty: 1 EACH | Refills: 0 | Status: SHIPPED | OUTPATIENT
Start: 2024-10-11

## 2024-10-10 RX ORDER — LIDOCAINE 50 MG/G
1 PATCH TOPICAL DAILY
Qty: 5 PATCH | Refills: 0 | Status: SHIPPED | OUTPATIENT
Start: 2024-10-11

## 2024-10-10 RX ORDER — DOCUSATE SODIUM 100 MG/1
100 CAPSULE, LIQUID FILLED ORAL 2 TIMES DAILY
Qty: 14 CAPSULE | Refills: 0 | Status: SHIPPED | OUTPATIENT
Start: 2024-10-10

## 2024-10-10 RX ORDER — ACETAMINOPHEN 325 MG/1
975 TABLET ORAL EVERY 8 HOURS SCHEDULED
Qty: 60 TABLET | Refills: 0 | Status: SHIPPED | OUTPATIENT
Start: 2024-10-10

## 2024-10-10 RX ORDER — OXYCODONE HYDROCHLORIDE 5 MG/1
TABLET ORAL
Qty: 10 TABLET | Refills: 0 | Status: ON HOLD | OUTPATIENT
Start: 2024-10-10 | End: 2024-10-17 | Stop reason: ALTCHOICE

## 2024-10-10 RX ADMIN — ENOXAPARIN SODIUM 30 MG: 30 INJECTION SUBCUTANEOUS at 05:47

## 2024-10-10 RX ADMIN — LIDOCAINE 1 PATCH: 50 PATCH CUTANEOUS at 09:53

## 2024-10-10 RX ADMIN — GABAPENTIN 300 MG: 300 CAPSULE ORAL at 09:53

## 2024-10-10 RX ADMIN — Medication 5000 UNITS: at 09:52

## 2024-10-10 RX ADMIN — ENOXAPARIN SODIUM 30 MG: 30 INJECTION SUBCUTANEOUS at 16:26

## 2024-10-10 RX ADMIN — ACETAMINOPHEN 975 MG: 325 TABLET ORAL at 05:47

## 2024-10-10 RX ADMIN — METHOCARBAMOL 500 MG: 500 TABLET ORAL at 13:48

## 2024-10-10 RX ADMIN — GABAPENTIN 300 MG: 300 CAPSULE ORAL at 16:26

## 2024-10-10 RX ADMIN — METHOCARBAMOL 500 MG: 500 TABLET ORAL at 05:47

## 2024-10-10 NOTE — PLAN OF CARE
Problem: Potential for Falls  Goal: Patient will remain free of falls  Description: INTERVENTIONS:  - Educate patient/family on patient safety including physical limitations  - Instruct patient to call for assistance with activity   - Consult OT/PT to assist with strengthening/mobility   - Keep Call bell within reach  - Keep bed low and locked with side rails adjusted as appropriate  - Keep care items and personal belongings within reach  - Initiate and maintain comfort rounds  - Make Fall Risk Sign visible to staff  - Offer Toileting every 2 Hours, in advance of need  - Initiate/Maintain bed/chair alarm  - Obtain necessary fall risk management equipment:   - Apply yellow socks and bracelet for high fall risk patients  - Consider moving patient to room near nurses station  Outcome: Adequate for Discharge     Problem: PAIN - ADULT  Goal: Verbalizes/displays adequate comfort level or baseline comfort level  Description: Interventions:  - Encourage patient to monitor pain and request assistance  - Assess pain using appropriate pain scale  - Administer analgesics based on type and severity of pain and evaluate response  - Implement non-pharmacological measures as appropriate and evaluate response  - Consider cultural and social influences on pain and pain management  - Notify physician/advanced practitioner if interventions unsuccessful or patient reports new pain  Outcome: Adequate for Discharge     Problem: DISCHARGE PLANNING  Goal: Discharge to home or other facility with appropriate resources  Description: INTERVENTIONS:  - Identify barriers to discharge w/patient and caregiver  - Arrange for needed discharge resources and transportation as appropriate  - Identify discharge learning needs (meds, wound care, etc.)  - Arrange for interpretive services to assist at discharge as needed  - Refer to Case Management Department for coordinating discharge planning if the patient needs post-hospital services based on  physician/advanced practitioner order or complex needs related to functional status, cognitive ability, or social support system  Outcome: Adequate for Discharge     Problem: Knowledge Deficit  Goal: Patient/family/caregiver demonstrates understanding of disease process, treatment plan, medications, and discharge instructions  Description: Complete learning assessment and assess knowledge base.  Interventions:  - Provide teaching at level of understanding  - Provide teaching via preferred learning methods  Outcome: Adequate for Discharge     Problem: NEUROSENSORY - ADULT  Goal: Achieves stable or improved neurological status  Description: INTERVENTIONS  - Monitor and report changes in neurological status  - Monitor vital signs such as temperature, blood pressure, glucose, and any other labs ordered   - Initiate measures to prevent increased intracranial pressure  - Monitor for seizure activity and implement precautions if appropriate      Outcome: Adequate for Discharge  Goal: Remains free of injury related to seizures activity  Description: INTERVENTIONS  - Maintain airway, patient safety  and administer oxygen as ordered  - Monitor patient for seizure activity, document and report duration and description of seizure to physician/advanced practitioner  - If seizure occurs,  ensure patient safety during seizure  - Reorient patient post seizure  - Seizure pads on all 4 side rails  - Instruct patient/family to notify RN of any seizure activity including if an aura is experienced  - Instruct patient/family to call for assistance with activity based on nursing assessment  - Administer anti-seizure medications if ordered    Outcome: Adequate for Discharge  Goal: Achieves maximal functionality and self care  Description: INTERVENTIONS  - Monitor swallowing and airway patency with patient fatigue and changes in neurological status  - Encourage and assist patient to increase activity and self care.   - Encourage visually  impaired, hearing impaired and aphasic patients to use assistive/communication devices  Outcome: Adequate for Discharge     Problem: MUSCULOSKELETAL - ADULT  Goal: Maintain or return mobility to safest level of function  Description: INTERVENTIONS:  - Assess patient's ability to carry out ADLs; assess patient's baseline for ADL function and identify physical deficits which impact ability to perform ADLs (bathing, care of mouth/teeth, toileting, grooming, dressing, etc.)  - Assess/evaluate cause of self-care deficits   - Assess range of motion  - Assess patient's mobility  - Assess patient's need for assistive devices and provide as appropriate  - Encourage maximum independence but intervene and supervise when necessary  - Involve family in performance of ADLs  - Assess for home care needs following discharge   - Consider OT consult to assist with ADL evaluation and planning for discharge  - Provide patient education as appropriate  Outcome: Adequate for Discharge  Goal: Maintain proper alignment of affected body part  Description: INTERVENTIONS:  - Support, maintain and protect limb and body alignment  - Provide patient/ family with appropriate education  Outcome: Adequate for Discharge     Problem: Nutrition/Hydration-ADULT  Goal: Nutrient/Hydration intake appropriate for improving, restoring or maintaining nutritional needs  Description: Monitor and assess patient's nutrition/hydration status for malnutrition. Collaborate with interdisciplinary team and initiate plan and interventions as ordered.  Monitor patient's weight and dietary intake as ordered or per policy. Utilize nutrition screening tool and intervene as necessary. Determine patient's food preferences and provide high-protein, high-caloric foods as appropriate.     INTERVENTIONS:  - Monitor oral intake, urinary output, labs, and treatment plans  - Assess nutrition and hydration status and recommend course of action  - Evaluate amount of meals eaten  -  Assist patient with eating if necessary   - Allow adequate time for meals  - Recommend/ encourage appropriate diets, oral nutritional supplements, and vitamin/mineral supplements  - Order, calculate, and assess calorie counts as needed  - Recommend, monitor, and adjust tube feedings and TPN/PPN based on assessed needs  - Assess need for intravenous fluids  - Provide specific nutrition/hydration education as appropriate  - Include patient/family/caregiver in decisions related to nutrition  Outcome: Adequate for Discharge     Problem: Prexisting or High Potential for Compromised Skin Integrity  Goal: Skin integrity is maintained or improved  Description: INTERVENTIONS:  - Identify patients at risk for skin breakdown  - Assess and monitor skin integrity  - Assess and monitor nutrition and hydration status  - Monitor labs   - Assess for incontinence   - Turn and reposition patient  - Assist with mobility/ambulation  - Relieve pressure over bony prominences  - Avoid friction and shearing  - Provide appropriate hygiene as needed including keeping skin clean and dry  - Evaluate need for skin moisturizer/barrier cream  - Collaborate with interdisciplinary team   - Patient/family teaching  - Consider wound care consult   Outcome: Adequate for Discharge

## 2024-10-10 NOTE — ASSESSMENT & PLAN NOTE
Fall while walking her small dog 10/5  Sustained below stated injuries  PT/OT recommending inpatient rehab  CM for dispo planning. D/C to  TCF.

## 2024-10-10 NOTE — INCIDENTAL FINDINGS
The following findings require follow up:  Radiographic finding   Finding: Osteoporosis    Follow up required: family doctor, endocrine, DEXA scan   Follow up should be done within 2 week(s)    Incidental finding results were discussed with the Patient by Elizabeth Cedeno PA-C on 10/10/24.   They expressed understanding and all questions answered.

## 2024-10-10 NOTE — CASE MANAGEMENT
[employer] [place] has received APPROVED authorization.  Insurance: [employer]   Called in by Rep: Jose ROSS#: [phone]    Authorization received for: SNF  Facility: [facility]   Authorization #: [id number]   H&CC Ref#: [id number]  Start of Care: 10/10  Next Review Date: 10/14  Submit next review to #: [phone]    Care Manager notified: Gwyn Jacques     Please reach out to  for updates on any clinical information.

## 2024-10-10 NOTE — CASE MANAGEMENT
Case Management Discharge Planning Note    Patient name Catherine Lemon  Location [facility] 619/[facility] 619-01 MRN [id number]  : 1950 Date 10/10/2024       Current Admission Date: 10/6/2024  Current Admission Diagnosis:Closed fracture of pelvis (HCC)   Patient Active Problem List    Diagnosis Date Noted Date Diagnosed    Fall 10/07/2024     Osteoporosis with current pathological fracture 10/07/2024     Closed fracture of pelvis (HCC) 10/06/2024       LOS (days): 4  Geometric Mean LOS (GMLOS) (days): 2.6  Days to GMLOS:-1.4     OBJECTIVE:  Risk of Unplanned Readmission Score: 8.76         Current admission status: Inpatient   Preferred Pharmacy:   [facility] - [place], [place] - [address]  [address]  [facility] [address]  Phone: [phone] Fax: [phone]    [facility] [place] [address] [facility] [address]  Phone: [phone] Fax: [phone]    Primary Care Provider: Douglas Charles Shoenberger, MD    Primary Insurance: [employer]  Secondary Insurance:     DISCHARGE DETAILS:      Requested Home Health Care         Is the patient interested in HHC at discharge?: No    DME Referral Provided  Referral made for DME?: No    Other Referral/Resources/Interventions Provided:  Interventions: Short Term Rehab    Treatment Team Recommendation: Short Term Rehab  Discharge Destination Plan:: Short Term Rehab    IMM Given (Date):: 10/10/24  IMM Given to:: Patient       Pt will d/c to  [facility] today via [employer] [phone] @1700  CM did call their dispatch and ask for a 3044 if possible, they will try, as  TCF wants the pt on the unit around 1730

## 2024-10-10 NOTE — CASE MANAGEMENT
Case Management Discharge Planning Note    Patient name Catherine Lemon  Location [facility] 619/[facility] 619-01 MRN [id number]  : 1950 Date 10/10/2024       Current Admission Date: 10/6/2024  Current Admission Diagnosis:Closed fracture of pelvis (HCC)   Patient Active Problem List    Diagnosis Date Noted Date Diagnosed    Fall 10/07/2024     Osteoporosis with current pathological fracture 10/07/2024     Closed fracture of pelvis (HCC) 10/06/2024       LOS (days): 4  Geometric Mean LOS (GMLOS) (days): 2.6  Days to GMLOS:-1.3     OBJECTIVE:  Risk of Unplanned Readmission Score: 8.76         Current admission status: Inpatient   Preferred Pharmacy:   [facility] - [place], [place] - [address]  [address]  [facility] [address]  Phone: [phone] Fax: [phone]    Primary Care Provider: Douglas Charles Shoenberger, MD    Primary Insurance: [employer]  Secondary Insurance:     DISCHARGE DETAILS:                                                                                                               Facility Insurance Auth Number: [id number]

## 2024-10-10 NOTE — DISCHARGE SUMMARY
"Discharge Summary - Trauma   Name: Catherine Lemon 74 y.o. female I MRN: [id number]  Unit/Bed#: [facility] 619-01 I Date of Admission: 10/6/2024   Date of Service: 10/10/2024 I Hospital Day: 4    Admission Date: 10/6/2024 1102  Discharge Date: 10/10/24  Admitting Diagnosis: Hip fracture ([facility]) [S72.009A]  Right hip pain [M25.551]  Fall, initial encounter [W19.XXXA]  Other injury of unspecified body region, initial encounter [T14.8XXA]  Multiple closed fractures of pelvis with stable disruption of pelvic ring, initial encounter ([facility]) [S32.810A]  Discharge Diagnosis:   Medical Problems       Resolved Problems  Date Reviewed: 10/10/2024   None         HPI: As documented by Goldie OCONNOR who evaluated the patient on admission, \"Catherine Lemon is a 74 y.o. female who presents after a fall yesterday while walking her dog.  Managed to get home and on the sofa and then no more moving.  Came to ER today secondary to pain and being unable to move the RLE.  Neurovascular intact, takes no meds except Ibuprofen for pain.  Lives with  who is dependent on her for care.  Will contact Care management to assist. \"    Procedures Performed: No orders of the defined types were placed in this encounter.      Summary of Hospital Course: Patient was placed on the trauma service following fall when she sustained multiple right pelvic fractures. She was seen by orthopedics who recommended non operative management, WBAT on B/L LE. She was placed on DVT ppx with lovenox and was placed on a multi modal pain regimen. Her pain was controlled and she worked with PT/OT who recommended inpatient rehab. She was in agreement and  secured a bed for her at [facility]. She will d/c there today. She will f/u with orthopedics as an outpatient. She is to f/u with endocrine for osteoporosis as well.  She is to take ASA 81 mg BID for 28 days for DVT ppx.       Significant Findings, Care, Treatment and Services Provided:   XR hip/pelv 2-3 vws right if " performed    Result Date: 10/7/2024  Impression: Known right-sided pelvic fractures are not well visualized radiographically though there is stable and anatomic alignment. Computerized Assisted Algorithm (CAA) may have been used to analyze all applicable images. Workstation performed: [id number]     CT lumbar spine without contrast    Result Date: 10/6/2024  Impression: No acute compression collapse of the vertebra Fracture of the right sacral ala with mild widening of the right sacroiliac joint Workstation performed: [id number]     CT pelvis wo contrast    Result Date: 10/6/2024  Impression: Nondisplaced crush fracture of the right sacrum. Nondisplaced fractures of the right inferior and superior pubic rami. 2 superior pubic rami fractures are noted one adjacent to the pubic symphysis and a second anterior to the right acetabulum. FFP type IIb fracture. Osteoporosis. Posterior disc extrusion at L5-S1 that extends into the left neural foramen with marked left-sided neural foraminal narrowing. The study was marked in EPIC for immediate notification. Workstation performed: [id number]        Complications: none    Condition at Discharge: good       Discharge instructions/Information to patient and family:   See After Visit Summary (AVS) for information provided to patient and family.      Provisions for Follow-Up Care:  See after visit summary for information related to follow-up care and any pertinent home health orders.      PCP: Douglas Charles Shoenberger, MD    Disposition: Short-term rehab at [facility]    Planned Readmission: No     Discharge Medications:  See after visit summary for reconciled discharge medications provided to patient and family.      Discharge Statement:  I have spent a total time of 25 minutes in caring for this patient on the day of the visit/encounter. .

## 2024-10-10 NOTE — PROGRESS NOTES
Progress Note - Trauma   Name: Catherine Lemon 74 y.o. female I MRN: [id number]  [facility] I Date of Admission: 10/6/2024   Date of Service: 10/10/2024 I Hospital Day: 4    Assessment & Plan  Fall  Fall while walking her small dog 10/5  Sustained below stated injuries  PT/OT recommending inpatient rehab  CM for dispo planning. D/C to [facility].   Closed fracture of pelvis (HCC)  Tripped and fell while walking her dog on 10/5  Pain in RLE and pelvis with walking and when lying still so came to ED on 10/6  10/6 CT pelvis - nondisplaced crush fracture of right sacrum, nondisplaced fractures of the right interior and superior pubic rami. Posterior disc extrusion at L5-S1 that extendins into left neural foramen with marked left sided neural foraminal narrowing   Orthopedics consulted, appreciate recommendations  10/7 - repeat pelvis XR stable anatomical alignment  DVT ppx ASA 81 BID on discharge; Lovenox for now  PT/OT   WBAT BL LE  F/u with orthopedics as an outpatient  Osteoporosis with current pathological fracture  - f/u outpatient DEXA scan with endocrine  - Vitamin D and calcium supplementation    Bowel Regimen: colace, senna ,miralax  VTE Prophylaxis:VTE covered by:  enoxaparin, Subcutaneous, 30 mg at 10/10/24 0547    and Sequential compression device (Venodyne)      Disposition: continue med-surg status; rehab placement pending at [facility] pending auth Please contact the SecureChat role for the Trauma service with any questions/concerns.    24 Hour Events : No acute events  Subjective : Patient's pain is controlled. She is moving well with the walker. She is motivated to go to rehab. Tolerating a diet and moving her bowels.     Objective :  Temp:  [97.5 °F (36.4 °C)-98.3 °F (36.8 °C)] 97.5 °F (36.4 °C)  HR:  [66-85] 66  BP: ()/(48-71) 110/63  Resp:  [17-18] 17  SpO2:  [95 %-97 %] 95 %  O2 Device: None (Room air)    I/O         10/08 0701  10/09 0700 10/09 0701  10/10 0700 10/10 0701  10/11  0700    P.O. 600 360     Total Intake(mL/kg) 600 (9.4) 360 (5.6)     Urine (mL/kg/hr) 800 (0.5) 400 (0.3)     Stool 0 0     Total Output 800 400     Net -200 -40            Unmeasured Urine Occurrence 1 x 3 x 1 x    Unmeasured Stool Occurrence 1 x 1 x           Lines/Drains/Airways       Active Status       Name Placement date Placement time Site Days    External Urinary Catheter --  --  -- --                  Physical Exam  Constitutional:       Appearance: Normal appearance.   HENT:      Head: Normocephalic.      Nose: Nose normal.      Mouth/Throat:      Mouth: Mucous membranes are moist.   Cardiovascular:      Rate and Rhythm: Normal rate and regular rhythm.      Pulses: Normal pulses.      Heart sounds: Normal heart sounds. No murmur heard.  Pulmonary:      Effort: Pulmonary effort is normal. No respiratory distress.      Breath sounds: Normal breath sounds.   Abdominal:      General: Abdomen is flat.      Palpations: Abdomen is soft.      Tenderness: There is no abdominal tenderness.   Musculoskeletal:         General: Tenderness present. No swelling. Normal range of motion.      Cervical back: Normal range of motion and neck supple. No tenderness.      Comments: + R hip tenderness laterally/posteriorly with small R lateral hip ecchymosis; No peripheral edema; + NVI distally in B/L LE   Skin:     General: Skin is warm and dry.   Neurological:      General: No focal deficit present.      Mental Status: She is alert and oriented to person, place, and time.      Sensory: No sensory deficit.      Motor: No weakness.   Psychiatric:         Behavior: Behavior normal.               Lab Results: I have reviewed the following results:  Recent Labs     10/08/24  0838 10/08/24  1629 10/09/24  0503 10/10/24  0719   WBC 5.19 4.89  --   --    HGB 12.1 11.1*  --   --    HCT 35.9 32.2*  --   --     196  --   --    SODIUM 136 135   < > 137   K 3.8 4.3   < > 4.1    104   < > 105   CO2 27 26   < > 28   BUN 6 12   <  > 8   CREATININE 0.61 0.68   < > 0.56*   GLUC 125 103   < > 98   MG 1.8*  --   --   --    PHOS 2.7  --   --   --     < > = values in this interval not displayed.       Imaging Results Review: No pertinent imaging studies reviewed.  Other Study Results Review: No additional pertinent studies reviewed.

## 2024-10-10 NOTE — ASSESSMENT & PLAN NOTE
Tripped and fell while walking her dog on 10/5  Pain in RLE and pelvis with walking and when lying still so came to ED on 10/6  10/6 CT pelvis - nondisplaced crush fracture of right sacrum, nondisplaced fractures of the right interior and superior pubic rami. Posterior disc extrusion at L5-S1 that extendins into left neural foramen with marked left sided neural foraminal narrowing   Orthopedics consulted, appreciate recommendations  10/7 - repeat pelvis XR stable anatomical alignment  DVT ppx ASA 81 BID on discharge; Lovenox for now  PT/OT   WBAT BL LE  F/u with orthopedics as an outpatient

## 2024-10-10 NOTE — CASE MANAGEMENT
Case Management Discharge Planning Note    Patient name Catherine Lemon  Location [facility] 619/[facility] 619-01 MRN [id number]  : 1950 Date 10/10/2024       Current Admission Date: 10/6/2024  Current Admission Diagnosis:Closed fracture of pelvis (HCC)   Patient Active Problem List    Diagnosis Date Noted Date Diagnosed    Fall 10/07/2024     Osteoporosis with current pathological fracture 10/07/2024     Closed fracture of pelvis (HCC) 10/06/2024       LOS (days): 4  Geometric Mean LOS (GMLOS) (days): 2.6  Days to GMLOS:-1.3     OBJECTIVE:  Risk of Unplanned Readmission Score: 8.76         Current admission status: Inpatient   Preferred Pharmacy:   [facility] [address] [place], [place] - [facility]  [address]  [facility] [address]  Phone: [phone] Fax: [phone]    Primary Care Provider: Douglas Charles Shoenberger, MD    Primary Insurance: "[employer]  Secondary Insurance:     DISCHARGE DETAILS:    Auth obtained  Will d/c to [facility] today. Awaiting Roundtrip to come back online to schedule rides.

## 2024-10-11 ENCOUNTER — NURSING HOME VISIT (OUTPATIENT)
Dept: GERIATRICS | Facility: OTHER | Age: 74
End: 2024-10-11
Payer: COMMERCIAL

## 2024-10-11 DIAGNOSIS — K59.09 CHRONIC CONSTIPATION: ICD-10-CM

## 2024-10-11 DIAGNOSIS — D64.9 ACUTE ANEMIA: ICD-10-CM

## 2024-10-11 DIAGNOSIS — Z71.89 ADVANCE CARE PLANNING: ICD-10-CM

## 2024-10-11 DIAGNOSIS — S32.82XD MULTIPLE CLOSED FRACTURES OF PELVIS WITHOUT DISRUPTION OF PELVIC RING WITH ROUTINE HEALING, SUBSEQUENT ENCOUNTER: Primary | ICD-10-CM
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
## 2024-10-11 DIAGNOSIS — R26.2 AMBULATORY DYSFUNCTION: ICD-10-CM

## 2024-10-11 DIAGNOSIS — G89.11 ACUTE PAIN DUE TO TRAUMA: ICD-10-CM

## 2024-10-11 DIAGNOSIS — E55.9 VITAMIN D INSUFFICIENCY: ICD-10-CM

## 2024-10-11 DIAGNOSIS — Z91.89 AT RISK FOR DELIRIUM: ICD-10-CM

## 2024-10-11 DIAGNOSIS — M80.00XD AGE-RELATED OSTEOPOROSIS WITH CURRENT PATHOLOGICAL FRACTURE WITH ROUTINE HEALING, SUBSEQUENT ENCOUNTER: Chronic | ICD-10-CM

## 2024-10-11 PROCEDURE — 99305 1ST NF CARE MODERATE MDM 35: CPT | Performed by: STUDENT IN AN ORGANIZED HEALTH CARE EDUCATION/TRAINING PROGRAM

## 2024-10-11 NOTE — ASSESSMENT & PLAN NOTE
Fell on 10/5 while walking dog due to tripping on the leash. No preceding cardiopulmonary/syncopal symptoms. No headstrike or LOC.  See remainder of plan

## 2024-10-11 NOTE — ASSESSMENT & PLAN NOTE
"S/p fall on 10/5  Trauma workup concerning for \"Nondisplaced crush fracture of the right sacrum. Nondisplaced fractures of the right inferior and superior pubic rami. 2 superior pubic rami fractures are noted one adjacent to the pubic symphysis and a second anterior to the right acetabulum. FFP type IIb fracture. Osteoporosis. Posterior disc extrusion at L5-S1 that extends into the left neural foramen with marked left-sided neural foraminal narrowing. \"  Evaluated by Orthopedics and Neurosurgery inpatient with conservative/non-operative management recommended  Weight-bearing status: WBAT to bilateral lower extremities  Pain control as appropriate  DVT ppx: lovenox at rehab; anticipate transition to aspirin 81mg BID on discharge to complete ~28 days post-fall DVT prophylaxis as per Orthopedics recommendation  PT/OT  Follow up with PCP, Orthopedics as appropriate    "

## 2024-10-11 NOTE — ASSESSMENT & PLAN NOTE
Known hx of osteoporosis with DXA from April 2018 consistent with osteoporosis  Clinically evident as well with fragility fracture after fal  Evaluated by Endocrine inpatient  Continue calcium/vitamin D supplementation (presently on 1000mg calcium and 5000u vit D which is at or close to Endocrine recommendation)  See remainder of plan  Follow up with PCP, Endocrine as appropriate. Would likely benefit from bisphosphonate or other pharmacotherapy, defer to outpatient team once she is stable from acute presenting issue

## 2024-10-11 NOTE — PROGRESS NOTES
[facility] Care  Facility: [facility]    HISTORY AND PHYSICAL  Nursing Home Place of Service: nursing home place of service: POS [facility] Skilled Care-Part A Coverage    NAME: Catherine Lemon  : 1950 AGE: 74 y.o. SEX: female MRN: [id number]  DATE OF ENCOUNTER: 10/11/2024    Records Reviewed include: Hospital records    Chief Complaint/ Reason for Admission:   Pelvic fractures    History of Present Illness:     Catherine Lemon is a 74 y.o. female with PMH including osteoporosis  For details of hospitalization, see hospital records including discharge documentation  Patient was hospitalized at [facility] from 10/6 to 10/10/24  Briefly, patient hospitalized after a fall (on 10/5, mechanical while walking dog) with pain at the right hip/leg; placed under trauma service and found to have multiple right pelvic/sacral fractures; evaluated by Orthopedics and Neurosurgery with conservative/non-operative management; WBAT to bilateral lower extremities; also evaluated by Endocrine for osteoporosis; deemed stable for discharge to rehab; to follow up with Orthopedics and Endocrine (for osteoporosis)    Patient seen and examined in room after morning therapy session, observed patient ambulating in camp with walker with therapy  Others present: none  Patient seated in chair  Appears comfortable, awake, alert, oriented to situation, able to converse appropriately  Patient polite, appears in good spirits, Aox3, appears to be a good historian. Notes she feels well overall, motivated to do therapy and return home ASAP.  She has pain since her fall at the right hip/right sacral region. No notable pain at rest. Up to around 5/10 when ambulating. Can get very severe with certain movements. Overall she feels it is stable and gradually improving and she feels functionally she is able to do more. No acute pain anywhere else  Breathing fine, on room air, no acute SOB, no orthopnea  No recent  "CP/palpitations or orthostatic lightheadedness  Appetite stable/good, no acute swallowing concerns  Urinating well without acute symptoms  Last BM yesterday normal/easy. No abd pain/N/V  Does not feel acutely sick or confused  No acute cardiopulmonary, abdominal, or urinary symptoms; see ROS for more details.    No further questions or acute concerns identified.    Lab Review:  10/10: BMP generally stable/non-actionable, GFR 92; CBC 10/8 with Hb 11.1 (borderline macrocytic, baseline around 13); vit D 23.3 from 10/6      No results found for: \"HGBA1C\"  Lab Results   Component Value Date    PWR9MPFYEWXE 1.592 10/06/2024     Lab Results   Component Value Date    UXCINLDI84 236 12/13/2022     No results found for: \"IRON\", \"TIBC\", \"FERRITIN\"  Lab Results   Component Value Date    CHOLESTEROL 197 12/13/2022     Lab Results   Component Value Date    HDL 99 12/13/2022     Lab Results   Component Value Date    TRIG 79 12/13/2022     Lab Results   Component Value Date    NONHDLC 98 12/13/2022     Lab Results   Component Value Date    LDLCALC 82 12/13/2022           XR hip/pelv 2-3 vws right if performed  Result Date: 10/7/2024  Known right-sided pelvic fractures are not well visualized radiographically though there is stable and anatomic alignment.     CT lumbar spine without contrast  Result Date: 10/6/2024  No acute compression collapse of the vertebra Fracture of the right sacral ala with mild widening of the right sacroiliac joint    CT pelvis wo contrast  Result Date: 10/6/2024  Impression: Nondisplaced crush fracture of the right sacrum. Nondisplaced fractures of the right inferior and superior pubic rami. 2 superior pubic rami fractures are noted one adjacent to the pubic symphysis and a second anterior to the right acetabulum. FFP type IIb fracture. Osteoporosis. Posterior disc extrusion at L5-S1 that extends into the left neural foramen with marked left-sided neural foraminal narrowing.       No recent EKG on " file  No recent echo on file        [place] PDMP reviewed 10/11/2024  No patient matching the search criteria submitted was identified       Social: Patient lives in a 1 story home with , has a dog. At baseline fully independent including ADLs, medications, finances, driving. Has a  twice weekly. At baseline does not use DME and denies any falls apart from the one recently leading to present hospitalization.    Lives: Home, with spouse,  Social Support:   Fall in the past 12 months: 1  Use of assistance Device: None    Allergies  No Known Allergies    Past Medical History  History reviewed. No pertinent past medical history.     History reviewed. No pertinent surgical history.    Family History  Family History   Problem Relation Age of Onset    No Known Problems Mother     Stomach cancer Father     No Known Problems Sister     No Known Problems Maternal Grandmother     No Known Problems Maternal Grandfather     No Known Problems Paternal Grandmother     No Known Problems Paternal Grandfather     No Known Problems Brother     No Known Problems Paternal Aunt        Social History  Social History     Tobacco Use   Smoking Status Never   Smokeless Tobacco Never      Social History     Substance and Sexual Activity   Alcohol Use Never      Social History     Substance and Sexual Activity   Drug Use Not on file            Physical Exam    Vital Signs  There were no vitals filed for this visit.  BP: 96/55 mmHg  10/11/2024 09:36   Temp:97.6 °F  10/11/2024 09:36 Pulse:75 bpm  10/11/2024 09:36 Weight:140 Lbs  10/11/2024 06:11   Resp:16 Breaths/min  10/11/2024 09:36 BS: O2:95 %  10/11/2024 09:36 Pain:5  10/11/2024 08:46         Physical Exam  Vitals reviewed.   Constitutional:       General: She is not in acute distress.     Appearance: She is not toxic-appearing or diaphoretic.   HENT:      Head: Normocephalic and atraumatic.      Right Ear: External ear normal.      Left Ear: External ear normal.       Nose: Nose normal. No rhinorrhea.      Mouth/Throat:      Mouth: Mucous membranes are dry.      Pharynx: Oropharynx is clear. No posterior oropharyngeal erythema.   Eyes:      General: No scleral icterus.        Right eye: No discharge.         Left eye: No discharge.      Extraocular Movements: Extraocular movements intact.      Conjunctiva/sclera: Conjunctivae normal.      Pupils: Pupils are equal, round, and reactive to light.   Cardiovascular:      Rate and Rhythm: Normal rate and regular rhythm.   Pulmonary:      Effort: Pulmonary effort is normal. No respiratory distress.      Breath sounds: Normal breath sounds. No wheezing or rales.   Abdominal:      General: Bowel sounds are normal.      Palpations: Abdomen is soft.      Tenderness: There is no abdominal tenderness. There is no guarding.   Musculoskeletal:         General: No tenderness.      Cervical back: No rigidity.      Comments: Very minimal/trace peripheral swelling  No calf tenderness  No notable bruising or open wound at R hip region   Skin:     General: Skin is warm and dry.      Coloration: Skin is not jaundiced.   Neurological:      General: No focal deficit present.      Mental Status: She is alert and oriented to person, place, and time. Mental status is at baseline.   Psychiatric:         Mood and Affect: Mood normal.         Behavior: Behavior normal.         Thought Content: Thought content normal.         Judgment: Judgment normal.         Review of Systems:  Review of Systems   Constitutional:  Negative for appetite change, chills, diaphoresis and fever.   HENT:  Negative for drooling, ear pain, hearing loss, rhinorrhea, sore throat and trouble swallowing.    Eyes:  Negative for pain, discharge, redness, itching and visual disturbance.   Respiratory:  Negative for cough, chest tightness, shortness of breath and wheezing.    Cardiovascular:  Negative for chest pain, palpitations and leg swelling.   Gastrointestinal:  Positive for  constipation. Negative for abdominal pain, blood in stool, diarrhea, nausea and vomiting.   Genitourinary:  Negative for difficulty urinating, dysuria, hematuria and urgency.   Musculoskeletal:  Positive for arthralgias, back pain and gait problem. Negative for neck pain.   Skin:  Negative for color change.   Neurological:  Positive for weakness. Negative for dizziness, syncope, facial asymmetry, speech difficulty, light-headedness and headaches.   Psychiatric/Behavioral:  Negative for agitation, behavioral problems and confusion. The patient is not nervous/anxious and is not hyperactive.    All other systems reviewed and are negative.      List of Current Medications:  Current Outpatient Medications   Medication Instructions    acetaminophen (TYLENOL) 975 mg, Oral, Every 8 hours scheduled    calcium carbonate (OYSTER SHELL,OSCAL) 500 mg 2 tablets, Oral, Daily with breakfast    Cholecalciferol (VITAMIN D3) 5,000 Units, Oral, Daily    docusate sodium (COLACE) 100 mg, Oral, 2 times daily    gabapentin (NEURONTIN) 300 mg, Oral, 3 times daily    lidocaine (LIDODERM) 5 % 1 patch, Topical, Daily, Remove & Discard patch within 12 hours or as directed by MD    methocarbamol (ROBAXIN) 500 mg, Oral, Every 8 hours scheduled    oxyCODONE (ROXICODONE) 5 immediate release tablet May take 1 tablet (5 mg total) by mouth every 4 (four) hours as needed for severe pain. May also take 0.5 tablets (2.5 mg total) every 4 (four) hours as needed for severe pain. Do all this for 10 days. Max Daily Amount: 45 mg.    polyethylene glycol (MIRALAX) 17 g, Oral, Daily    senna (SENOKOT) 17.2 mg, Oral, Daily at bedtime         Medication reviewed. All orders signed. Complete list is in the paper chart.     Allergies  No Known Allergies    Labs/Diagnostics (reviewed by this provider):     I personally reviewed lab results and imaging studies. Full reports are in the paper chart.     Assessment/Plan:    Fall  Fell on 10/5 while walking dog due to  "tripping on the leash. No preceding cardiopulmonary/syncopal symptoms. No headstrike or LOC.  See remainder of plan    Osteoporosis with current pathological fracture  Known hx of osteoporosis with DXA from April 2018 consistent with osteoporosis  Clinically evident as well with fragility fracture after fal  Evaluated by Endocrine inpatient  Continue calcium/vitamin D supplementation (presently on 1000mg calcium and 5000u vit D which is at or close to Endocrine recommendation)  See remainder of plan  Follow up with PCP, Endocrine as appropriate. Would likely benefit from bisphosphonate or other pharmacotherapy, defer to outpatient team once she is stable from acute presenting issue      Closed fracture of pelvis (HCC)  S/p fall on 10/5  Trauma workup concerning for \"Nondisplaced crush fracture of the right sacrum. Nondisplaced fractures of the right inferior and superior pubic rami. 2 superior pubic rami fractures are noted one adjacent to the pubic symphysis and a second anterior to the right acetabulum. FFP type IIb fracture. Osteoporosis. Posterior disc extrusion at L5-S1 that extends into the left neural foramen with marked left-sided neural foraminal narrowing. \"  Evaluated by Orthopedics and Neurosurgery inpatient with conservative/non-operative management recommended  Weight-bearing status: WBAT to bilateral lower extremities  Pain control as appropriate  DVT ppx: lovenox at rehab; anticipate transition to aspirin 81mg BID on discharge to complete ~28 days post-fall DVT prophylaxis as per Orthopedics recommendation  PT/OT  Follow up with PCP, Orthopedics as appropriate      Vitamin D insufficiency  vit D 23.3 from 10/6  Continue vitamin D supplementation (presently 5000u daily as per Endocrine recommendation)  Follow up with PCP    Advance care planning  HCP and code status discussion as per note      At risk for delirium  Delirium precautions  -Patient is high risk of delirium due to age, comorbidities, " hospitalization/unfamiliar environment  -delirium precautions  -maintain normal sleep/wake cycle  -minimize overnight interruptions, group overnight vitals/labs/nursing checks as possible  -dim lights, close blinds and turn off tv to minimize stimulation and encourage sleep environment in evenings  -ensure that pain is well controlled  -monitor for fecal and urinary retention which may precipitate delirium  -encourage early mobilization and ambulation  -provide frequent reorientation and redirection  -encourage family and friends at the bedside to help help calm patient if anxious  -avoid medications which may precipitate or worsen delirium such as tramadol, benzodiazepine, anticholinergics, and benadryl  -encourage hydration and nutrition   -redirect unwanted behaviors as first line, avoid physical restraints, use chemical restraint only if all other attempts have been unsuccessful      Ambulatory dysfunction  Acute issue related to fractures/pain  -PT/OT  -fall precautions  -encourage appropriate DME use  -SW to follow for safe discharge planning/homecare services as appropriate      Acute pain due to trauma  Monitor pain  Current regimen including: tylenol 975mg TID, gabapentin 300mg TID, methocarbamol 1000mg TID, oxycodone 2.5 or 5mg q4hr PRN, lidocaine patch  Adjust/wean regimen as appropriate. As of 10/11 titrated methocarbamol as that seems most helpful to her. Aim to wean/stop oxycodone by discharge if possible.  Follow up with PCP, Orthopedics    Acute anemia  Baseline Hb around 13 range  Recently acutely a bit lower/trending down - likely related to fall/ fractures, with likely some related internal bleeding  Borderline macrocytic - will check B12 level with next labs, historically it has been low-normal. Will also empirically start B12 500mcg daily (patient notes she does take this B12 dose in outpatient setting)  -monitor on routine labs - trending down recently  -monitor for acute bleed - no present  signs externally  -consider further workup, pelvic imaging to assess for hematoma, Heme consult if persistent/worsening  -transfuse PRN Hb <7  -low threshold to hold anticoagulation if worsening      Chronic constipation  Patient endorses chronic constipation at baseline typically going ~5 days between BM. Does not take anything for this at baseline.  At risk due to hospitalization, relative immobility, comorbidities, pain regimen  Monitor stool output - last BM yesterday normal/easy, she thinks constipation better being on bowel regimen in hospital  Bowel regimen at facility: miralax and senna and docusate, adjust as appropriate; consider bisacodyl suppository PRN if no BM in 2-3 days  Encourage mobility as tolerated, PO hydration as appropriate, high fiber diet/prune juice (in outpatient setting as appropriate)  Goal is for 1 easy BM every 1-2 days  No colonoscopy results on file. Patient reports she had 1 colonoscopy around 10 years ago reportedly with some polyps removed and is interested in GI referral for updated colonoscopy. GI referral provided.       Pain: see above  Rehab Potential:Good  Patient Informed of Medical Condition: yes   Patient is Capable of Understanding Their Right: yes   Discharge Plan: home  Vaccination:   Immunization History   Administered Date(s) Administered    COVID-19 PFIZER VACCINE 0.3 ML IM 03/19/2021, 04/09/2021, 10/12/2021       DVT ppx: lovenox at rehab; anticipate transition to aspirin 81mg BID on discharge to complete ~28 days post-fall DVT prophylaxis as per Orthopedics recommendation    Advanced Directives: patient verbalizes primary HCP as  Valentino and alternate as sister-in-law Shereen  Code status:Full Code Extensive discussion/education with patient today regarding their wishes with respect to resuscitative measures; discussed as appropriate potential risks vs benefits of resuscitative measures; patient verbalizes understanding, appears to have capacity to make  this decision presently, and clearly verbalizes a desire for Full Code citing she has much to live for  PCP: Shoenberger      -Total time spent on this encounter today including documentation and workup review, face to face time, history and exam, and documentation/orders was approximately 60 minutes.  -This note will be copied to [facility] EMR where vitals and medication orders are placed.    Ed Jones D.O.  Geriatric Medicine  10/11/2024 12:19 PM

## 2024-10-11 NOTE — ASSESSMENT & PLAN NOTE
Monitor pain  Current regimen including: tylenol 975mg TID, gabapentin 300mg TID, methocarbamol 1000mg TID, oxycodone 2.5 or 5mg q4hr PRN, lidocaine patch  Adjust/wean regimen as appropriate. As of 10/11 titrated methocarbamol as that seems most helpful to her. Aim to wean/stop oxycodone by discharge if possible.  Follow up with PCP, Orthopedics

## 2024-10-11 NOTE — ASSESSMENT & PLAN NOTE
At risk due to hospitalization, relative immobility, comorbidities, pain regimen  Monitor stool output  Bowel regimen at facility: miralax and senna as appropriate; consider bisacodyl suppository PRN if no BM in 2-3 days  Encourage mobility as tolerated, PO hydration as appropriate, high fiber diet/prune juice (in outpatient setting as appropriate)  Goal is for 1 easy BM every 1-2 days

## 2024-10-11 NOTE — ASSESSMENT & PLAN NOTE
Baseline Hb around 13 range  Recently acutely a bit lower/trending down - likely related to fall/ fractures, with likely some related internal bleeding  Borderline macrocytic - will check B12 level with next labs, historically it has been low-normal. Will also empirically start B12 500mcg daily (patient notes she does take this B12 dose in outpatient setting)  -monitor on routine labs - trending down recently  -monitor for acute bleed - no present signs externally  -consider further workup, pelvic imaging to assess for hematoma, Heme consult if persistent/worsening  -transfuse PRN Hb <7  -low threshold to hold anticoagulation if worsening

## 2024-10-11 NOTE — ASSESSMENT & PLAN NOTE
vit D 23.3 from 10/6  Continue vitamin D supplementation (presently 5000u daily as per Endocrine recommendation)  Follow up with PCP

## 2024-10-11 NOTE — ED PROVIDER NOTES
Time reflects when diagnosis was documented in both MDM as applicable and the Disposition within this note       Time User Action Codes Description Comment    10/6/2024 12:00 PM SebleFlex Add [W19.XXXA] Fall, initial encounter     10/6/2024 12:00 PM Flex Pruett Add [M25.551] Right hip pain     10/6/2024  1:28 PM Flex Pruett Add [S72.009A] Hip fracture (HCC)     10/6/2024  4:44 PM Keyur Muro [S32.810A] Multiple closed fractures of pelvis with stable disruption of pelvic ring, initial encounter (HCC)     10/7/2024 10:58 AM Jennifer Solomon Add [W19.XXXD] Fall, subsequent encounter           ED Disposition       ED Disposition   Admit    Condition   Stable    Date/Time   Sun Oct 6, 2024 12:00 PM    Comment                  Assessment & Plan       Medical Decision Making  Patient is 74-year-old female presenting for mechanical fall and right hip pain.  DDx: Hip fracture, mechanical fall, rule out lumbar spine fracture  Based on patient presentation and physical exam finds, primary concern is for evaluation of possible hip fracture.  Plans for CT lumbar spine pelvis and x-rays of the right hip.  Concern for sacral flynn fracture and pubic rami fracture.  Discussed findings with patient.  Trauma team contacted.  Plan for admission secondary to pain control and physical therapy.  Patient understands agrees with plan.  Patient admitted to trauma.    Problems Addressed:  Fall, initial encounter: acute illness or injury  Hip fracture (HCC): acute illness or injury  Right hip pain: acute illness or injury    Amount and/or Complexity of Data Reviewed  Radiology: ordered.    Risk  Prescription drug management.  Decision regarding hospitalization.        ED Course as of 10/11/24 0111   Sun Oct 06, 2024   5176 SO: Admitted to trauma for pubic rami fractures, mechanicalfall       Medications   fentanyl citrate (PF) (FOR EMS ONLY) 100 mcg/2 mL injection 100 mcg (0 mcg Does not apply Given to EMS 10/6/24 1108)   HYDROmorphone  (DILAUDID) injection 0.5 mg (0.5 mg Intravenous Given 10/6/24 1128)       ED Risk Strat Scores                           SBIRT 22yo+      Flowsheet Row Most Recent Value   Initial Alcohol Screen: US AUDIT-C     1. How often do you have a drink containing alcohol? 0 Filed at: 10/06/2024 1114   2. How many drinks containing alcohol do you have on a typical day you are drinking?  0 Filed at: 10/06/2024 1114   3a. Male UNDER 65: How often do you have five or more drinks on one occasion? 0 Filed at: 10/06/2024 1114   3b. FEMALE Any Age, or MALE 65+: How often do you have 4 or more drinks on one occassion? 0 Filed at: 10/06/2024 1114   Audit-C Score 0 Filed at: 10/06/2024 1114   MICHAEL: How many times in the past year have you...    Used an illegal drug or used a prescription medication for non-medical reasons? Never Filed at: 10/06/2024 1114                            History of Present Illness       Chief Complaint   Patient presents with    Hip Pain     Pt fell yesterday while walking her dog and has not been able to move her R hip since. Bruising to R arm. -loc, -h/s       History reviewed. No pertinent past medical history.   History reviewed. No pertinent surgical history.   Family History   Problem Relation Age of Onset    No Known Problems Mother     Stomach cancer Father     No Known Problems Sister     No Known Problems Maternal Grandmother     No Known Problems Maternal Grandfather     No Known Problems Paternal Grandmother     No Known Problems Paternal Grandfather     No Known Problems Brother     No Known Problems Paternal Aunt       Social History     Tobacco Use    Smoking status: Never    Smokeless tobacco: Never   Substance Use Topics    Alcohol use: Never      E-Cigarette/Vaping      E-Cigarette/Vaping Substances      I have reviewed and agree with the history as documented.     HPI  Patient is a 74-year-old female presenting for mechanical fall after she tripped while walking her dog yesterday.  Patient  states she initially was able to ambulate has had increasing pain since that time that now states is too painful to ambulate.  Most of her pain is focused around her right hip.  Patient does have some bruising on her right arm but denies any difficulty ranging her right arm.  Right upper extremity is neurovasc intact.  Patient Nuys any head strike no loss consciousness, patient is on blood thinners.  Review of Systems   Constitutional: Negative.    HENT: Negative.     Eyes: Negative.    Respiratory: Negative.     Cardiovascular: Negative.    Gastrointestinal: Negative.    Endocrine: Negative.    Genitourinary: Negative.    Musculoskeletal:         Right hip pain   Skin: Negative.    Allergic/Immunologic: Negative.    Neurological: Negative.    Hematological: Negative.    Psychiatric/Behavioral: Negative.     All other systems reviewed and are negative.          Objective       ED Triage Vitals   Temperature Pulse Blood Pressure Respirations SpO2 Patient Position - Orthostatic VS   10/06/24 1115 10/06/24 1110 10/06/24 1110 10/06/24 1110 10/06/24 1110 10/07/24 2056   98 °F (36.7 °C) 85 (!) 175/93 20 100 % Lying      Temp Source Heart Rate Source BP Location FiO2 (%) Pain Score    10/06/24 1115 10/06/24 1110 10/06/24 1110 -- 10/06/24 1112    Oral Monitor Left arm  8      Vitals      Date and Time Temp Pulse SpO2 Resp BP Pain Score FACES Pain Rating User   10/10/24 1043 97.8 °F (36.6 °C) -- -- -- -- -- -- AB   10/10/24 1043 -- 75 99 % 18 111/64 -- -- DII   10/10/24 0952 -- -- 98 % -- -- 4 -- NFS   10/10/24 0635 -- 66 95 % 17 110/63 -- -- DII   10/10/24 0330 97.5 °F (36.4 °C) -- -- -- -- -- -- JM   10/10/24 0330 -- 69 97 % -- 109/62 -- -- DII   10/09/24 2307 98.3 °F (36.8 °C) -- -- -- -- -- -- JM   10/09/24 2307 -- 82 95 % -- 102/58 -- -- DII   10/09/24 2010 98.1 °F (36.7 °C) -- -- -- -- -- -- JM   10/09/24 2010 -- 79 95 % -- 106/59 -- -- DII   10/09/24 1930 -- -- -- -- -- 2 -- MF   10/09/24 1744 -- 85 95 % -- 128/67 --  -- DII   10/09/24 1619 -- 76 97 % -- 90/48 -- -- DII   10/09/24 1157 98.1 °F (36.7 °C) -- -- -- -- -- -- AB   10/09/24 1157 -- 80 95 % 18 122/71 -- -- DII   10/09/24 0934 -- -- 98 % -- -- 7 -- NFS   10/09/24 0934 97.4 °F (36.3 °C) -- -- -- -- -- -- AB   10/09/24 0712 -- 70 96 % 18 106/61 -- -- DII   10/09/24 0511 -- -- -- -- -- 8 -- MF   10/09/24 0238 98.1 °F (36.7 °C) -- -- -- -- -- -- MM   10/09/24 0238 -- 69 96 % 16 104/68 -- -- DII   10/08/24 2300 98 °F (36.7 °C) -- -- -- -- -- -- DK   10/08/24 2248 -- 69 96 % 18 98/55 -- -- DII   10/08/24 1930 -- -- -- -- -- 4 -- MF   10/08/24 1724 -- -- -- -- 92/56 -- -- NFS   10/08/24 1724 98.1 °F (36.7 °C) -- -- -- -- -- -- DK   10/08/24 1723 -- 64 94 % -- 94/55 -- -- NFS   10/08/24 1719 -- 72 96 % -- 94/55 -- -- DII   10/08/24 1642 -- 67 95 % -- 95/56 -- -- DII   10/08/24 1548 -- -- -- -- 90/46 nurse notified -- -- DK   10/08/24 1532 98.3 °F (36.8 °C) -- -- -- 89/42 nurse notified -- -- DK   10/08/24 1532 -- 71 95 % 16 -- -- -- DII   10/08/24 1133 -- -- -- -- -- 6 -- NFS   10/08/24 1045 98.2 °F (36.8 °C) -- -- -- -- -- -- AB   10/08/24 1045 -- 74 97 % 16 109/63 -- -- DII   10/08/24 1042 -- -- -- -- -- 9 -- NFS   10/08/24 0838 -- -- 95 % -- -- 6 -- NFS   10/08/24 0734 -- 69 95 % -- 116/68 -- -- DII   10/08/24 0636 -- -- -- -- -- 10 - Worst Possible Pain -- LV   10/08/24 0612 -- -- -- -- -- 3 -- LV   10/08/24 0220 -- 75 96 % 17 109/67 -- -- DII   10/07/24 2231 98.4 °F (36.9 °C) -- -- -- -- -- -- VH   10/07/24 2231 -- 77 96 % 18 109/66 -- -- DII   10/07/24 2056 98.2 °F (36.8 °C) -- -- 16 -- -- -- VH   10/07/24 2056 -- 82 96 % -- 141/83 -- -- DII   10/07/24 1955 -- 71 96 % 16 109/57 -- -- DII   10/07/24 1922 -- -- -- -- -- No Pain -- HMG   10/07/24 1603 -- -- -- -- -- 8 -- HMG   10/07/24 1500 98 °F (36.7 °C) -- -- -- -- -- -- SK   10/07/24 1447 -- 77 97 % 21 114/67 -- -- DII   10/07/24 1136 98 °F (36.7 °C) -- -- -- -- -- -- SK   10/07/24 1136 -- 78 98 % 18 115/69 -- -- DII    10/07/24 1121 -- -- -- -- -- 8 -- CA   10/07/24 1120 -- -- -- -- -- 8 -- RE   10/07/24 1103 -- -- -- -- -- 8 -- AG   10/07/24 0711 98 °F (36.7 °C) -- -- -- -- -- -- AB   10/07/24 0711 -- 78 97 % 18 111/60 -- -- DII   10/07/24 0330 98.1 °F (36.7 °C) -- -- -- -- -- -- TM   10/07/24 0330 -- 69 98 % 17 112/59 -- -- DII   10/06/24 2300 -- 79 96 % -- 102/64 -- -- MK   10/06/24 2259 -- 84 96 % -- 102/64 -- -- DII   10/06/24 2257 -- 78 96 % -- 92/54 -- -- DII   10/06/24 2221 97.7 °F (36.5 °C) -- -- -- -- -- -- AK   10/06/24 2221 -- 79 96 % 16 87/54 -- -- DII   10/06/24 1727 -- -- -- -- -- 10 - Worst Possible Pain -- JR   10/06/24 1709 -- -- -- -- -- 10 - Worst Possible Pain -- JR   10/06/24 1709 97.8 °F (36.6 °C) -- -- -- -- -- -- DK   10/06/24 1709 -- 79 98 % 18 149/73 -- -- DII   10/06/24 1521 -- -- -- -- -- 10 - Worst Possible Pain -- JS   10/06/24 1500 -- 82 97 % 20 170/78 -- -- JS   10/06/24 1400 -- 77 98 % 16 126/70 -- -- JS   10/06/24 1200 -- 75 100 % 18 137/67 -- -- JS   10/06/24 1115 98 °F (36.7 °C) -- -- -- -- -- -- JS   10/06/24 1112 -- -- -- -- -- 8 -- LOUIE   10/06/24 1110 -- 85 100 % 20 175/93 -- -- JS            Physical Exam  Vitals and nursing note reviewed.   Constitutional:       Appearance: Normal appearance. She is normal weight.   HENT:      Head: Normocephalic and atraumatic.      Right Ear: Tympanic membrane, ear canal and external ear normal.      Left Ear: Tympanic membrane, ear canal and external ear normal.      Nose: Nose normal.      Mouth/Throat:      Mouth: Mucous membranes are moist.      Pharynx: Oropharynx is clear.   Eyes:      Extraocular Movements: Extraocular movements intact.      Conjunctiva/sclera: Conjunctivae normal.      Pupils: Pupils are equal, round, and reactive to light.   Neck:      Comments: Patient has no C or T-spine tenderness however does have L4-L5 tenderness palpation however no palpable form is no step-offs.  Cardiovascular:      Rate and Rhythm: Normal rate and  regular rhythm.      Pulses: Normal pulses.      Heart sounds: Normal heart sounds.   Pulmonary:      Effort: Pulmonary effort is normal.      Breath sounds: Normal breath sounds.   Abdominal:      General: Abdomen is flat. Bowel sounds are normal.      Palpations: Abdomen is soft.   Musculoskeletal:      Cervical back: Normal range of motion and neck supple.      Comments: Patient is able to range her right hip but has significant tenderness palpation over the posterior lateral aspect.  Right lower extremity neurovascular intact.  2+ DP PT pulse.  Motor intact.  Sensory intact.  Left lower extremity exam within normal limits.  Neurovascular intact.  Full range of motion.  Patient not able to ambulate though secondary to pain   Skin:     General: Skin is warm and dry.      Capillary Refill: Capillary refill takes less than 2 seconds.   Neurological:      General: No focal deficit present.      Mental Status: She is alert and oriented to person, place, and time.   Psychiatric:         Mood and Affect: Mood normal.         Behavior: Behavior normal.         Thought Content: Thought content normal.         Judgment: Judgment normal.         Results Reviewed       Procedure Component Value Units Date/Time    Vitamin D 25 hydroxy [[id number]]  (Abnormal) Collected: 10/06/24 1752    Lab Status: Final result Specimen: Blood from Arm, Right Updated: 10/06/24 1849     Vit D, 25-Hydroxy 23.3 ng/mL     PTH, intact [[id number]]  (Normal) Collected: 10/06/24 1753    Lab Status: Final result Specimen: Blood from Arm, Right Updated: 10/06/24 1840     PTH 43.2 pg/mL     TSH, 3rd generation [[id number]]  (Normal) Collected: 10/06/24 1754    Lab Status: Final result Specimen: Blood from Arm, Right Updated: 10/06/24 1836     TSH 3RD GENERATON 1.592 uIU/mL     Comprehensive metabolic panel [[id number]]  (Abnormal) Collected: 10/06/24 1753    Lab Status: Final result Specimen: Blood from Arm, Right Updated: 10/06/24 1823     Sodium 137  mmol/L      Potassium 4.1 mmol/L      Chloride 103 mmol/L      CO2 26 mmol/L      ANION GAP 8 mmol/L      BUN 8 mg/dL      Creatinine 0.49 mg/dL      Glucose 117 mg/dL      Calcium 8.9 mg/dL      AST 21 U/L      ALT 20 U/L      Alkaline Phosphatase 61 U/L      Total Protein 6.3 g/dL      Albumin 4.0 g/dL      Total Bilirubin 0.83 mg/dL      eGFR 96 ml/min/1.73sq m     Narrative:      National Kid[employer] Foundation guidelines for Chronic Kidney Disease (CKD):     Stage 1 with normal or high GFR (GFR > 90 mL/min/1.73 square meters)    Stage 2 Mild CKD (GFR = 60-89 mL/min/1.73 square meters)    Stage 3A Moderate CKD (GFR = 45-59 mL/min/1.73 square meters)    Stage 3B Moderate CKD (GFR = 30-44 mL/min/1.73 square meters)    Stage 4 Severe CKD (GFR = 15-29 mL/min/1.73 square meters)    Stage 5 End Stage CKD (GFR <15 mL/min/1.73 square meters)  Note: GFR calculation is accurate only with a steady state creatinine    Sedimentation rate, automated [[id number]]  (Normal) Collected: 10/06/24 1754    Lab Status: Final result Specimen: Blood from Arm, Right Updated: 10/06/24 1802     Sed Rate 2 mm/hour     Platelet count [[id number]]  (Normal) Collected: 10/06/24 1754    Lab Status: Final result Specimen: Blood from Arm, Right Updated: 10/06/24 1801     Platelets 187 Thousands/uL      MPV 9.9 fL             XR hip/pelv 2-3 vws right if performed   Final Interpretation by Micah Briggs MD (10/07 1631)      Known right-sided pelvic fractures are not well visualized radiographically though there is stable and anatomic alignment.         Computerized Assisted Algorithm (CAA) may have been used to analyze all applicable images.            Workstation performed: [id number]         CT pelvis wo contrast   Final Interpretation by Taj Mcgee MD (10/06 1257)      Nondisplaced crush fracture of the right sacrum. Nondisplaced fractures of the right inferior and superior pubic rami. 2 superior pubic rami fractures are noted  one adjacent to the pubic symphysis and a second anterior to the right acetabulum. FFP type    IIb fracture.      Osteoporosis.      Posterior disc extrusion at L5-S1 that extends into the left neural foramen with marked left-sided neural foraminal narrowing.      The study was marked in EPIC for immediate notification.            Workstation performed: [id number]         CT lumbar spine without contrast   Final Interpretation by Jessica Laguna MD (10/06 1301)   No acute compression collapse of the vertebra   Fracture of the right sacral ala with mild widening of the right sacroiliac joint      Workstation performed: [id number]             Procedures    ED Medication and Procedure Management   Prior to Admission Medications   Prescriptions Last Dose Informant Patient Reported? Taking?   alendronate (Fosamax) 70 mg tablet Not Taking  Yes No   Sig: Take 1 tablet by mouth once a week   Patient not taking: Reported on 10/6/2024      Facility-Administered Medications: None     Discharge Medication List as of 10/10/2024  2:26 PM        START taking these medications    Details   acetaminophen (TYLENOL) 325 mg tablet Take 3 tablets (975 mg total) by mouth every 8 (eight) hours, Starting Thu 10/10/2024, Normal      calcium carbonate (OYSTER SHELL,OSCAL) 500 mg Take 2 tablets by mouth daily with breakfast Do not start before October 11, 2024., Starting Fri 10/11/2024, Normal      Cholecalciferol (VITAMIN D3) 1,000 units tablet Take 5 tablets (5,000 Units total) by mouth daily, Starting Fri 10/11/2024, Normal      docusate sodium (COLACE) 100 mg capsule Take 1 capsule (100 mg total) by mouth 2 (two) times a day, Starting Thu 10/10/2024, Normal      gabapentin (NEURONTIN) 300 mg capsule Take 1 capsule (300 mg total) by mouth 3 (three) times a day, Starting Thu 10/10/2024, Normal      lidocaine (LIDODERM) 5 % Apply 1 patch topically over 12 hours daily Remove & Discard patch within 12 hours or as directed by MD, Starting Fri  10/11/2024, Normal      methocarbamol (ROBAXIN) 500 mg tablet Take 1 tablet (500 mg total) by mouth every 8 (eight) hours, Starting Thu 10/10/2024, Normal      oxyCODONE (ROXICODONE) 5 immediate release tablet Multiple Dosages:Starting Thu 10/10/2024, Until Sun 10/20/2024 at 2359May take 1 tablet (5 mg total) by mouth every 4 (four) hours as needed for severe pain. May also take 0.5 tablets (2.5 mg total) every 4 (four) hours as needed for severe pain. Do al l this for 10 days. Max Daily Amount: 45 mg., Normal      polyethylene glycol (MIRALAX) 17 g packet Take 17 g by mouth daily, Starting Fri 10/11/2024, Normal      senna (SENOKOT) 8.6 mg Take 2 tablets (17.2 mg total) by mouth daily at bedtime, Starting Thu 10/10/2024, Normal           STOP taking these medications       alendronate (Fosamax) 70 mg tablet Comments:   Reason for Stopping:             Outpatient Discharge Orders   Discharge Diet     Discharge Condition:  Improving     Patient Aware of Diagnosis: Yes     Free of Communicable Disease:   Yes     Physical Therapy Eval And Treat     Occupational Therapy Eval and Treat     Weight Bearing Status     No dressing needed     ED SEPSIS DOCUMENTATION   Time reflects when diagnosis was documented in both MDM as applicable and the Disposition within this note       Time User Action Codes Description Comment    10/6/2024 12:00 PM Flex Pruett [W19.XXXA] Fall, initial encounter     10/6/2024 12:00 PM Flex Pruett [M25.551] Right hip pain     10/6/2024  1:28 PM Flex Pruett [S72.009A] Hip fracture (HCC)     10/6/2024  4:44 PM Keyur Muro [S32.810A] Multiple closed fractures of pelvis with stable disruption of pelvic ring, initial encounter ([facility])     10/7/2024 10:58 AM Solomon Rodriguez [W19.XXXD] Fall, subsequent encounter                  Flex Pruett MD  10/11/24 0111

## 2024-10-11 NOTE — ASSESSMENT & PLAN NOTE
Acute issue related to fractures/pain  -PT/OT  -fall precautions  -encourage appropriate DME use  -SW to follow for safe discharge planning/homecare services as appropriate

## 2024-10-11 NOTE — ASSESSMENT & PLAN NOTE
Patient endorses chronic constipation at baseline typically going ~5 days between BM. Does not take anything for this at baseline.  At risk due to hospitalization, relative immobility, comorbidities, pain regimen  Monitor stool output - last BM yesterday normal/easy, she thinks constipation better being on bowel regimen in hospital  Bowel regimen at facility: miralax and senna and docusate, adjust as appropriate; consider bisacodyl suppository PRN if no BM in 2-3 days  Encourage mobility as tolerated, PO hydration as appropriate, high fiber diet/prune juice (in outpatient setting as appropriate)  Goal is for 1 easy BM every 1-2 days  No colonoscopy results on file. Patient reports she had 1 colonoscopy around 10 years ago reportedly with some polyps removed and is interested in GI referral for updated colonoscopy. GI referral provided.

## 2024-10-11 NOTE — UTILIZATION REVIEW
NOTIFICATION OF ADMISSION DISCHARGE   This is a Notification of Discharge from [facility]. Please be advised that this patient has been discharge from our facility. Below you will find the admission and discharge date and time including the patient’s disposition.   UTILIZATION REVIEW CONTACT:  Rose Torres  Utilization   Network Utilization Review Department  Phone: [phone] x carefully listen to the prompts. All voicemails are confidential.  Email: [email or web address]     ADMISSION INFORMATION  PRESENTATION DATE: 10/6/2024 11:02 AM  OBERVATION ADMISSION DATE: N/A  INPATIENT ADMISSION DATE: 10/6/24  2:21 PM   DISCHARGE DATE: 10/10/2024  5:43 PM   DISPOSITION:Released to SNF/TCU/SNU Facility    Network Utilization Review Department  ATTENTION: Please call with any questions or concerns to [phone] and carefully listen to the prompts so that you are directed to the right person. All voicemails are confidential.   For Discharge needs, contact Care Management DC Support Team at [phone] opt. 2  Send all requests for admission clinical reviews, approved or denied determinations and any other requests to dedicated fax number below belonging to the campus where the patient is receiving treatment. List of dedicated fax numbers for the Facilities:  FACILITY NAME UR FAX NUMBER   ADMISSION DENIALS (Administrative/Medical Necessity) [phone]   DISCHARGE SUPPORT TEAM ([facility]) [phone]   PARENT CHILD HEALTH (Maternity/NICU/Pediatrics) [phone]   [facility] [phone]   [facility] [phone]   [facility] [phone]   [facility] [phone]   Count includes the [facility] [phone]   General acute hospital [phone]   [facility] [phone]   [facility] [phone]   [facility] [phone]   [facility] [phone]   [facility] [phone]   [facility] [phone]

## 2024-10-15 ENCOUNTER — NURSING HOME VISIT (OUTPATIENT)
Dept: GERIATRICS | Facility: OTHER | Age: 74
End: 2024-10-15
Payer: COMMERCIAL

## 2024-10-15 DIAGNOSIS — K59.09 CHRONIC CONSTIPATION: ICD-10-CM

## 2024-10-15 DIAGNOSIS — G89.11 ACUTE PAIN DUE TO TRAUMA: ICD-10-CM

## 2024-10-15 DIAGNOSIS — S32.82XD MULTIPLE CLOSED FRACTURES OF PELVIS WITHOUT DISRUPTION OF PELVIC RING WITH ROUTINE HEALING, SUBSEQUENT ENCOUNTER: Primary | ICD-10-CM

## 2024-10-15 DIAGNOSIS — D64.9 ACUTE ANEMIA: ICD-10-CM

## 2024-10-15 PROCEDURE — 99309 SBSQ NF CARE MODERATE MDM 30: CPT | Performed by: STUDENT IN AN ORGANIZED HEALTH CARE EDUCATION/TRAINING PROGRAM

## 2024-10-15 NOTE — ASSESSMENT & PLAN NOTE
Patient endorses chronic constipation at baseline typically going ~5 days between BM. Does not take anything for this at baseline.  At risk due to hospitalization, relative immobility, comorbidities, pain regimen  Monitor stool output - last BM today normal/easy, she thinks constipation better being on bowel regimen recently  Bowel regimen at facility: continue miralax and senna and docusate regimen at rehab, adjust as appropriate; consider bisacodyl suppository PRN if no BM in 2-3 days  Encourage mobility as tolerated, PO hydration as appropriate, high fiber diet/prune juice (in outpatient setting as appropriate)  Goal is for 1 easy BM every 1-2 days  No colonoscopy results on file. Patient reported she had 1 colonoscopy around 10 years ago reportedly with some polyps removed and is interested in GI referral for updated colonoscopy. GI referral has been provided.

## 2024-10-15 NOTE — PROGRESS NOTES
"[facility]  Facility: [facility]    PROGRESS NOTE  Nursing Home Place of Service: nursing home place of service: POS 31 Skilled Care-Part A Coverage    NAME: Catherine Lemon  : 1950 AGE: 74 y.o. SEX: female MRN: [id number]  DATE OF ENCOUNTER: 10/15/2024    Assessment and Plan     Problem List Items Addressed This Visit       Closed fracture of pelvis (HCC) - Primary     S/p fall on 10/5  Trauma workup concerning for \"Nondisplaced crush fracture of the right sacrum. Nondisplaced fractures of the right inferior and superior pubic rami. 2 superior pubic rami fractures are noted one adjacent to the pubic symphysis and a second anterior to the right acetabulum. FFP type IIb fracture. Osteoporosis. Posterior disc extrusion at L5-S1 that extends into the left neural foramen with marked left-sided neural foraminal narrowing. \"  Evaluated by Orthopedics and Neurosurgery inpatient with conservative/non-operative management recommended  Weight-bearing status: WBAT to bilateral lower extremities  Pain control as appropriate  DVT ppx: was on lovenox, transitioned to aspirin 81mg BID to complete ~28 days post-fall DVT prophylaxis (through ~24) as per Orthopedics recommendation  PT/OT  Follow up with PCP, Orthopedics as appropriate           Acute pain due to trauma     Monitor pain  Current regimen including: tylenol 975mg TID, gabapentin 100mg TID, methocarbamol 1000mg TID, oxycodone 2.5 or 5mg q4hr PRN, lidocaine patch  Adjust/wean regimen as appropriate. As of 10/11 titrated methocarbamol as that seems most helpful to her. As of 10/15 weaning gabapentin with goal to discontinue by discharge. She has been using less oxycodone recently. Aim to wean/stop oxycodone by discharge if possible.  Follow up with PCP, Orthopedics         Acute anemia     Baseline Hb around 12-13 range  Recently acutely a bit lower/trending down - likely related to fall/ fractures, with likely " some related mild internal bleeding  Seems overall mild and asymptomatic  Borderline macrocytic - B12 WNL as of 10/14 - continue B12 supplement which patient takes daily outpatient as well  Empirically started iron supplement as of 10/15  -monitor on routine labs - trending down recently with limited data, though not severe anemia. Next check on 10/16  -monitor for acute bleed - no present signs externally  -stool occult blood test negative as of 10/15  -consider further workup, pelvic imaging to assess for hematoma, Heme consult if persistent/worsening  -transfuse PRN Hb <7  -low threshold to hold anticoagulation if worsening. As of 10/15 with shared decision making with patient given she is cleared for independent ambulation and much more mobile recently, have stopped lovenox and transitioned her to aspirin BID as above.  -Have educated patient extensively about risks of NSAID use including bleed risk and advised to not use NSAIDs on a scheduled or long-term basis  -Discussed close follow-up with PCP for repeat labs as appropriate in outpatient setting           Chronic constipation     Patient endorses chronic constipation at baseline typically going ~5 days between BM. Does not take anything for this at baseline.  At risk due to hospitalization, relative immobility, comorbidities, pain regimen  Monitor stool output - last BM today normal/easy, she thinks constipation better being on bowel regimen recently  Bowel regimen at facility: continue miralax and senna and docusate regimen at rehab, adjust as appropriate; consider bisacodyl suppository PRN if no BM in 2-3 days  Encourage mobility as tolerated, PO hydration as appropriate, high fiber diet/prune juice (in outpatient setting as appropriate)  Goal is for 1 easy BM every 1-2 days  No colonoscopy results on file. Patient reported she had 1 colonoscopy around 10 years ago reportedly with some polyps removed and is interested in GI referral for updated  "colonoscopy. GI referral has been provided.                Chief Complaint     Follow up pain, anemia    History of Present Illness     Catherine Lemon is a 74 y.o. female who was seen today for follow up. PMH including osteoporosis     Patient seen and examined in room  Others present: none  Patient seated in chair, able to stand independently from chair. Has been cleared for independent ambulation with walker.  Appears comfortable, awake, alert, oriented to situation, able to converse appropriately  Patient polite, appears in good spirits, Aox3, appears to be a good historian, mentation seems stable. Notes she feels well overall, better than last week including stronger and pain being better, happy about her independent sign for walker, motivated to continue therapy and return home ASAP. She hopes to go home by end of this week which seems reasonable with her current trajectory at rehab.  She has pain since her fall at the right hip/right sacral region. No notable pain at rest. The pain is gradually improving and better than last week overall. Worsens with therapy. Can get very severe with certain movements. No acute pain anywhere else  Breathing fine, on room air, no acute SOB, no orthopnea  No recent CP/palpitations or orthostatic lightheadedness  Appetite very good, no acute swallowing concerns  Urinating well without acute symptoms  Last BM today normal/easy. No abd pain/N/V  She is not noting bleeding from any source.  Does not feel acutely sick or confused  No acute cardiopulmonary, abdominal, or urinary symptoms; see ROS for more details.     No further questions or acute concerns identified.     Lab Review:  10/10: BMP generally stable/non-actionable, GFR 92; CBC 10/8 with Hb 11.1 (borderline macrocytic, baseline around 12-13); vit D 23.3 from 10/6  10/14: BMP generally stable/non-actionable; CBC with Hb 10.4 (macrocytic); B12 512        No results found for: \"HGBA1C\"        Lab Results   Component Value " "Date     SWX5QZQSPWFQ 1.592 10/06/2024            Lab Results   Component Value Date     VRAKIKOS10 236 12/13/2022      No results found for: \"IRON\", \"TIBC\", \"FERRITIN\"        Lab Results   Component Value Date     CHOLESTEROL 197 12/13/2022            Lab Results   Component Value Date     HDL 99 12/13/2022            Lab Results   Component Value Date     TRIG 79 12/13/2022            Lab Results   Component Value Date     NONHDLC 98 12/13/2022            Lab Results   Component Value Date     LDLCALC 82 12/13/2022               XR hip/pelv 2-3 vws right if performed  Result Date: 10/7/2024  Known right-sided pelvic fractures are not well visualized radiographically though there is stable and anatomic alignment.      CT lumbar spine without contrast  Result Date: 10/6/2024  No acute compression collapse of the vertebra Fracture of the right sacral ala with mild widening of the right sacroiliac joint     CT pelvis wo contrast  Result Date: 10/6/2024  Impression: Nondisplaced crush fracture of the right sacrum. Nondisplaced fractures of the right inferior and superior pubic rami. 2 superior pubic rami fractures are noted one adjacent to the pubic symphysis and a second anterior to the right acetabulum. FFP type IIb fracture. Osteoporosis. Posterior disc extrusion at L5-S1 that extends into the left neural foramen with marked left-sided neural foraminal narrowing.         No recent EKG on file  No recent echo on file           [place] PDMP reviewed 10/11/2024  No patient matching the search criteria submitted was identified        The following portions of the patient's history were reviewed and updated as appropriate: allergies, current medications, past family history, past medical history, past social history, past surgical history and problem list.    Review of Systems     Review of Systems   Constitutional:  Negative for appetite change, chills, diaphoresis and fever.   HENT:  Negative for drooling, ear pain, hearing " loss, rhinorrhea, sore throat and trouble swallowing.    Eyes:  Negative for pain, discharge, redness, itching and visual disturbance.   Respiratory:  Negative for cough, chest tightness, shortness of breath and wheezing.    Cardiovascular:  Negative for chest pain, palpitations and leg swelling.   Gastrointestinal:  Negative for abdominal pain, blood in stool, constipation, diarrhea, nausea and vomiting.   Genitourinary:  Negative for difficulty urinating, dysuria, hematuria and urgency.   Musculoskeletal:  Positive for arthralgias (improving), back pain (improving) and gait problem. Negative for neck pain.   Skin:  Negative for color change.   Neurological:  Negative for dizziness, syncope, facial asymmetry, speech difficulty, weakness (much improved, near baseline), light-headedness and headaches.   Psychiatric/Behavioral:  Negative for agitation, behavioral problems and confusion. The patient is not nervous/anxious and is not hyperactive.    All other systems reviewed and are negative.      Active Problem List     Patient Active Problem List   Diagnosis    Closed fracture of pelvis (HCC)    Fall    Osteoporosis with current pathological fracture    Vitamin D insufficiency    Advance care planning    At risk for delirium    At risk for constipation    Ambulatory dysfunction    Acute pain due to trauma    Acute anemia    Chronic constipation       Objective     Vital Signs:     BP: 108/56 mmHg  10/15/2024 07:21   Temp:96.5 °F  10/15/2024 07:21 Pulse:65 bpm  10/15/2024 07:21 Weight:140 Lbs  10/11/2024 06:11   Resp:18 Breaths/min  10/15/2024 07:21 BS: O2:95 %  10/15/2024 07:29 Pain:5  10/15/2024 10:17       Physical Exam  Vitals reviewed.   Constitutional:       General: She is not in acute distress.     Appearance: She is not toxic-appearing or diaphoretic.   HENT:      Head: Normocephalic and atraumatic.      Right Ear: External ear normal.      Left Ear: External ear normal.      Nose: Nose normal. No rhinorrhea.       Mouth/Throat:      Mouth: Mucous membranes are dry.      Pharynx: Oropharynx is clear. No posterior oropharyngeal erythema.   Eyes:      General: No scleral icterus.        Right eye: No discharge.         Left eye: No discharge.      Extraocular Movements: Extraocular movements intact.      Conjunctiva/sclera: Conjunctivae normal.      Pupils: Pupils are equal, round, and reactive to light.   Cardiovascular:      Rate and Rhythm: Normal rate and regular rhythm.   Pulmonary:      Effort: Pulmonary effort is normal. No respiratory distress.      Breath sounds: Normal breath sounds. No wheezing or rales.   Abdominal:      General: Bowel sounds are normal.      Palpations: Abdomen is soft.      Tenderness: There is no abdominal tenderness. There is no guarding.   Musculoskeletal:         General: No tenderness.      Cervical back: No rigidity.      Comments: trace peripheral swelling  No calf tenderness  No notable bruising or open wound or tenderness at R hip region   Skin:     General: Skin is warm and dry.      Coloration: Skin is not jaundiced.   Neurological:      General: No focal deficit present.      Mental Status: She is alert and oriented to person, place, and time. Mental status is at baseline.   Psychiatric:         Mood and Affect: Mood normal.         Behavior: Behavior normal.         Thought Content: Thought content normal.         Judgment: Judgment normal.         Pertinent Laboratory/Diagnostic Studies:  Laboratory and Imaging studies reviewed. Full report in the paper chart.     Current Medications   Medications reviewed and updated in facility chart.      -Total time spent on this encounter today including documentation and workup review, face to face time, history and exam, and documentation/orders was approximately 40 minutes.  -This note will be copied to [facility] EMR where vitals and medication orders are placed.    Ed Jones D.O.  Geriatric Medicine  10/15/2024 12:23 PM

## 2024-10-15 NOTE — ASSESSMENT & PLAN NOTE
Baseline Hb around 12-13 range  Recently acutely a bit lower/trending down - likely related to fall/ fractures, with likely some related mild internal bleeding  Seems overall mild and asymptomatic  Borderline macrocytic - B12 WNL as of 10/14 - continue B12 supplement which patient takes daily outpatient as well  Empirically started iron supplement as of 10/15  -monitor on routine labs - trending down recently with limited data, though not severe anemia. Next check on 10/16  -monitor for acute bleed - no present signs externally  -stool occult blood test negative as of 10/15  -consider further workup, pelvic imaging to assess for hematoma, Heme consult if persistent/worsening  -transfuse PRN Hb <7  -low threshold to hold anticoagulation if worsening. As of 10/15 with shared decision making with patient given she is cleared for independent ambulation and much more mobile recently, have stopped lovenox and transitioned her to aspirin BID as above.  -Have educated patient extensively about risks of NSAID use including bleed risk and advised to not use NSAIDs on a scheduled or long-term basis  -Discussed close follow-up with PCP for repeat labs as appropriate in outpatient setting

## 2024-10-15 NOTE — ASSESSMENT & PLAN NOTE
"S/p fall on 10/5  Trauma workup concerning for \"Nondisplaced crush fracture of the right sacrum. Nondisplaced fractures of the right inferior and superior pubic rami. 2 superior pubic rami fractures are noted one adjacent to the pubic symphysis and a second anterior to the right acetabulum. FFP type IIb fracture. Osteoporosis. Posterior disc extrusion at L5-S1 that extends into the left neural foramen with marked left-sided neural foraminal narrowing. \"  Evaluated by Orthopedics and Neurosurgery inpatient with conservative/non-operative management recommended  Weight-bearing status: WBAT to bilateral lower extremities  Pain control as appropriate  DVT ppx: was on lovenox, transitioned to aspirin 81mg BID to complete ~28 days post-fall DVT prophylaxis (through ~11/2/24) as per Orthopedics recommendation  PT/OT  Follow up with PCP, Orthopedics as appropriate    "

## 2024-10-15 NOTE — ASSESSMENT & PLAN NOTE
Monitor pain  Current regimen including: tylenol 975mg TID, gabapentin 100mg TID, methocarbamol 1000mg TID, oxycodone 2.5 or 5mg q4hr PRN, lidocaine patch  Adjust/wean regimen as appropriate. As of 10/11 titrated methocarbamol as that seems most helpful to her. As of 10/15 weaning gabapentin with goal to discontinue by discharge. She has been using less oxycodone recently. Aim to wean/stop oxycodone by discharge if possible.  Follow up with PCP, Orthopedics

## 2024-10-17 ENCOUNTER — NURSING HOME VISIT (OUTPATIENT)
Dept: GERIATRICS | Facility: OTHER | Age: 74
End: 2024-10-17
Payer: COMMERCIAL

## 2024-10-17 DIAGNOSIS — D64.9 ACUTE ANEMIA: ICD-10-CM

## 2024-10-17 DIAGNOSIS — K59.09 CHRONIC CONSTIPATION: ICD-10-CM

## 2024-10-17 DIAGNOSIS — S32.82XD MULTIPLE CLOSED FRACTURES OF PELVIS WITHOUT DISRUPTION OF PELVIC RING WITH ROUTINE HEALING, SUBSEQUENT ENCOUNTER: Primary | ICD-10-CM

## 2024-10-17 DIAGNOSIS — S32.810A MULTIPLE CLOSED FRACTURES OF PELVIS WITH STABLE DISRUPTION OF PELVIC RING, INITIAL ENCOUNTER (HCC): ICD-10-CM
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
## 2024-10-17 DIAGNOSIS — E55.9 VITAMIN D INSUFFICIENCY: ICD-10-CM

## 2024-10-17 DIAGNOSIS — M80.00XD AGE-RELATED OSTEOPOROSIS WITH CURRENT PATHOLOGICAL FRACTURE WITH ROUTINE HEALING, SUBSEQUENT ENCOUNTER: Chronic | ICD-10-CM

## 2024-10-17 DIAGNOSIS — G89.11 ACUTE PAIN DUE TO TRAUMA: ICD-10-CM

## 2024-10-17 PROCEDURE — 99316 NF DSCHRG MGMT 30 MIN+: CPT | Performed by: STUDENT IN AN ORGANIZED HEALTH CARE EDUCATION/TRAINING PROGRAM

## 2024-10-17 RX ORDER — ASPIRIN 81 MG/1
81 TABLET, CHEWABLE ORAL 2 TIMES DAILY
Qty: 30 TABLET | Refills: 0 | Status: SHIPPED | OUTPATIENT
Start: 2024-10-18 | End: 2024-11-02

## 2024-10-17 RX ORDER — METHOCARBAMOL 500 MG/1
1000 TABLET, FILM COATED ORAL EVERY 8 HOURS PRN
Qty: 60 TABLET | Refills: 0 | Status: SHIPPED | OUTPATIENT
Start: 2024-10-17

## 2024-10-17 RX ORDER — CALCIUM CARBONATE 500(1250)
2 TABLET ORAL
Qty: 60 TABLET | Refills: 0 | Status: SHIPPED | OUTPATIENT
Start: 2024-10-17

## 2024-10-17 RX ORDER — FERROUS SULFATE 325(65) MG
325 TABLET ORAL
Qty: 30 TABLET | Refills: 0 | Status: SHIPPED | OUTPATIENT
Start: 2024-10-17

## 2024-10-17 NOTE — ASSESSMENT & PLAN NOTE
Monitor pain  Current regimen including: tylenol 975mg TID, methocarbamol 1000mg TID, oxycodone 2.5 or 5mg q4hr PRN, lidocaine patch  Adjust/wean regimen as appropriate. As of 10/11 titrated methocarbamol as that seems most helpful to her. As of 10/15 weaning gabapentin with goal to discontinue by discharge. As of 10/17 stopped gabapentin, OK to send home with remaining oxycodone supply ~7 tablets which she can use PRN at home as discussed and will defer sending any further refills at present. She has been using less oxycodone recently.   Follow up with PCP, Orthopedics

## 2024-10-17 NOTE — PROGRESS NOTES
[facility]  Facility: [facility]    DISCHARGE SUMMARY  Nursing Home Place of Service: 31: SNF/Short Term Rehab    NAME: Catherine Lemon  : 1950 AGE: 74 y.o. SEX: female MRN: [id number]  DATE OF ENCOUNTER: 10/17/2024    DATE OF ADMISSION: 10/10/24 DATE OF DISCHARGE:today 10/17/24 DISCHARGE DISPOSITION: stable, home    HPI: Catherine Lemon is a 74 y.o. female with PMH including osteoporosis     Reason for admission: For details of hospitalization, see hospital records including discharge documentation  Patient was hospitalized at [facility] from 10/6 to 10/10/24  Briefly, patient hospitalized after a fall (on 10/5, mechanical while walking dog) with pain at the right hip/leg; placed under trauma service and found to have multiple right pelvic/sacral fractures; evaluated by Orthopedics and Neurosurgery with conservative/non-operative management; WBAT to bilateral lower extremities; also evaluated by Endocrine for osteoporosis; deemed stable for discharge to rehab; to follow up with Orthopedics and Endocrine (for osteoporosis)      Course of stay: Patient was admitted to [facility] for rehabilitation due to physical deconditioning and pelvic fracture. Significant events during the stay include: n/a. The patient participated in PT/OT.       Patient observed ambulating in camp with walker with therapy in good spirits today.  Patient seen and examined in room  Others present: none  Patient seated in chair, eating lunch, able to stand independently from chair. Has been cleared for independent ambulation with walker.  Appears comfortable, awake, alert, oriented to situation, able to converse appropriately  Patient polite, appears in good spirits, Aox3, appears to be a good historian, mentation seems stable. Notes she feels well overall, feels therapy has gone well, feels safe and happy going home today.  She has pain since her fall at the right  "hip/right sacral region. No notable pain at rest. The pain is gradually improving and better than last week overall. Worsens with therapy. Can get severe with certain movements. Overall controlled on present regimen. No acute pain anywhere else  Breathing fine, on room air, no acute SOB, no orthopnea  No recent CP/palpitations or orthostatic lightheadedness/dizziness  Appetite very good, no acute swallowing concerns  Urinating well without acute symptoms  Last BM yesterday normal/easy. No abd pain/N/V  She is not noting bleeding from any source.  Does not feel acutely sick or confused  No acute cardiopulmonary, abdominal, or urinary symptoms; see ROS for more details.     No further questions or acute concerns identified.     Lab Review:  10/10: BMP generally stable/non-actionable, GFR 92; CBC 10/8 with Hb 11.1 (borderline macrocytic, baseline around 12-13); vit D 23.3 from 10/6  10/14: BMP generally stable/non-actionable; CBC with Hb 10.4 (macrocytic); B12 512  10/16: Hb 11.2        No results found for: \"HGBA1C\"            Lab Results   Component Value Date     KEE0FZPVGTIJ 1.592 10/06/2024                Lab Results   Component Value Date     KPPZRPAI36 236 12/13/2022      No results found for: \"IRON\", \"TIBC\", \"FERRITIN\"            Lab Results   Component Value Date     CHOLESTEROL 197 12/13/2022                Lab Results   Component Value Date     HDL 99 12/13/2022                Lab Results   Component Value Date     TRIG 79 12/13/2022                Lab Results   Component Value Date     NONHDLC 98 12/13/2022                Lab Results   Component Value Date     LDLCALC 82 12/13/2022               XR hip/pelv 2-3 vws right if performed  Result Date: 10/7/2024  Known right-sided pelvic fractures are not well visualized radiographically though there is stable and anatomic alignment.      CT lumbar spine without contrast  Result Date: 10/6/2024  No acute compression collapse of the vertebra Fracture of the right " "sacral ala with mild widening of the right sacroiliac joint     CT pelvis wo contrast  Result Date: 10/6/2024  Impression: Nondisplaced crush fracture of the right sacrum. Nondisplaced fractures of the right inferior and superior pubic rami. 2 superior pubic rami fractures are noted one adjacent to the pubic symphysis and a second anterior to the right acetabulum. FFP type IIb fracture. Osteoporosis. Posterior disc extrusion at L5-S1 that extends into the left neural foramen with marked left-sided neural foraminal narrowing.         No recent EKG on file  No recent echo on file           [place] PDMP reviewed 10/11/2024  No patient matching the search criteria submitted was identified          Assessment/Plan:    Chronic constipation  Patient endorses chronic constipation at baseline typically going ~5 days between BM. Does not take anything for this at baseline.  At risk due to hospitalization, relative immobility, comorbidities, pain regimen  Monitor stool output - last BM yesterday normal/easy, she thinks constipation better being on bowel regimen recently  Bowel regimen at facility: continue miralax and senna and docusate regimen at rehab, adjust as appropriate; consider bisacodyl suppository PRN if no BM in 2-3 days  Encourage mobility as tolerated, PO hydration as appropriate, high fiber diet/prune juice (in outpatient setting as appropriate)  Goal is for 1 easy BM every 1-2 days  No colonoscopy results on file. Patient reported she had 1 colonoscopy around 10 years ago reportedly with some polyps removed and is interested in GI referral for updated colonoscopy. GI referral has been provided during rehab stay.  Follow up with PCP, GI as appropriate    Closed fracture of pelvis (HCC)  S/p fall on 10/5  Trauma workup concerning for \"Nondisplaced crush fracture of the right sacrum. Nondisplaced fractures of the right inferior and superior pubic rami. 2 superior pubic rami fractures are noted one adjacent to the pubic " "symphysis and a second anterior to the right acetabulum. FFP type IIb fracture. Osteoporosis. Posterior disc extrusion at L5-S1 that extends into the left neural foramen with marked left-sided neural foraminal narrowing. \"  Evaluated by Orthopedics and Neurosurgery inpatient with conservative/non-operative management recommended  Weight-bearing status: WBAT to bilateral lower extremities  Pain control as appropriate  DVT ppx: was on lovenox, transitioned to aspirin 81mg BID to complete ~28 days post-fall DVT prophylaxis (through ~11/2/24) as per Orthopedics recommendation  PT/OT  Follow up with PCP, Orthopedics as appropriate      Osteoporosis with current pathological fracture  Known hx of osteoporosis with DXA from April 2018 consistent with osteoporosis  Clinically evident as well with fragility fracture after fal  Evaluated by Endocrine inpatient  Continue calcium/vitamin D supplementation (presently on 1000mg calcium and 5000u vit D which is at or close to Endocrine recommendation)  See remainder of plan  Follow up with PCP, Endocrine as appropriate. Would likely benefit from bisphosphonate or other pharmacotherapy, defer to outpatient team once she is stable from acute presenting issue      Acute anemia  Baseline Hb around 12-13 range  Recently acutely a bit lower/trending down - likely related to fall/ fractures, with likely some related mild internal bleeding  Seems overall mild and asymptomatic  Borderline macrocytic - B12 WNL as of 10/14 - continue B12 supplement which patient takes daily outpatient as well  Empirically started iron supplement as of 10/15  -monitor on routine labs - has stabilized/improving now  -monitor for acute bleed - no present signs externally  -stool occult blood test negative as of 10/15  -consider further workup, pelvic imaging to assess for hematoma, Heme consult if persistent/worsening  -transfuse PRN Hb <7  -low threshold to hold anticoagulation if worsening. As of 10/15 with " shared decision making with patient given she is cleared for independent ambulation and much more mobile recently, have stopped lovenox and transitioned her to aspirin BID as above.  -Have educated patient extensively about risks of NSAID use including bleed risk and advised to not use NSAIDs on a scheduled or long-term basis  -Discussed close follow-up with PCP for repeat labs as appropriate in outpatient setting      Acute pain due to trauma  Monitor pain  Current regimen including: tylenol 975mg TID, methocarbamol 1000mg TID, oxycodone 2.5 or 5mg q4hr PRN, lidocaine patch  Adjust/wean regimen as appropriate. As of 10/11 titrated methocarbamol as that seems most helpful to her. As of 10/15 weaning gabapentin with goal to discontinue by discharge. As of 10/17 stopped gabapentin, OK to send home with remaining oxycodone supply ~7 tablets which she can use PRN at home as discussed and will defer sending any further refills at present. She has been using less oxycodone recently.   Follow up with PCP, Orthopedics    Fall  Fell on 10/5 while walking dog due to tripping on the leash. No preceding cardiopulmonary/syncopal symptoms. No headstrike or LOC.  See remainder of plan    Vitamin D insufficiency  vit D 23.3 from 10/6  Continue vitamin D supplementation (presently 5000u daily as per Endocrine recommendation)  Follow up with PCP           Review of Systems   Constitutional:  Negative for appetite change, chills, diaphoresis and fever.   HENT:  Negative for drooling, ear pain, hearing loss, rhinorrhea, sore throat and trouble swallowing.    Eyes:  Negative for pain, discharge, redness, itching and visual disturbance.   Respiratory:  Negative for cough, chest tightness, shortness of breath and wheezing.    Cardiovascular:  Negative for chest pain, palpitations and leg swelling.   Gastrointestinal:  Negative for abdominal pain, blood in stool, constipation, diarrhea, nausea and vomiting.   Genitourinary:  Negative for  difficulty urinating, dysuria, hematuria and urgency.   Musculoskeletal:  Positive for arthralgias (improving), back pain (improving) and gait problem. Negative for neck pain.   Skin:  Negative for color change.   Neurological:  Negative for dizziness, syncope, facial asymmetry, speech difficulty, weakness (much improved, near baseline), light-headedness and headaches.   Psychiatric/Behavioral:  Negative for agitation, behavioral problems and confusion. The patient is not nervous/anxious and is not hyperactive.    All other systems reviewed and are negative.      Vital Signs:     BP: 120/57 mmHg  10/17/2024 07:10 Temp:97.8 °F  10/17/2024 07:10 Pulse:68 bpm  10/17/2024 07:10 Weight:140 Lbs  10/17/2024 06:15   Resp:18 Breaths/min  10/17/2024 07:10 BS: O2:91 %  10/17/2024 07:18 Pain:0  10/17/2024 06:24       Exam:     Physical Exam  Vitals reviewed.   Constitutional:       General: She is not in acute distress.     Appearance: She is not toxic-appearing or diaphoretic.   HENT:      Head: Normocephalic and atraumatic.      Right Ear: External ear normal.      Left Ear: External ear normal.      Nose: Nose normal. No rhinorrhea.      Mouth/Throat:      Mouth: Mucous membranes are dry.      Pharynx: Oropharynx is clear. No posterior oropharyngeal erythema.   Eyes:      General: No scleral icterus.        Right eye: No discharge.         Left eye: No discharge.      Extraocular Movements: Extraocular movements intact.      Conjunctiva/sclera: Conjunctivae normal.      Pupils: Pupils are equal, round, and reactive to light.   Cardiovascular:      Rate and Rhythm: Normal rate and regular rhythm.   Pulmonary:      Effort: Pulmonary effort is normal. No respiratory distress.      Breath sounds: Normal breath sounds. No wheezing or rales.   Abdominal:      General: Bowel sounds are normal.      Palpations: Abdomen is soft.      Tenderness: There is no abdominal tenderness. There is no guarding.   Musculoskeletal:          General: No tenderness.      Cervical back: No rigidity.      Comments: trace peripheral swelling  No calf tenderness  No notable bruising or open wound or tenderness at R hip region   Skin:     General: Skin is warm and dry.      Coloration: Skin is not jaundiced.   Neurological:      General: No focal deficit present.      Mental Status: She is alert and oriented to person, place, and time. Mental status is at baseline.   Psychiatric:         Mood and Affect: Mood normal.         Behavior: Behavior normal.         Thought Content: Thought content normal.         Judgment: Judgment normal.           Discharge Medications: See discharge medication list which was reviewed and signed.    Status at time of discharge: Stable    Discussion with patient/family as appropriate and further instructions:  -Fall precautions  -Aspiration precautions  -Bleeding precautions  -Monitor for signs/symptoms of infection  -Medication list was reviewed and signed  -DME form was completed    Follow-up Recommendations: Please follow-up with your primary care physician within 7-10 days of discharge to review medication changes and current status.     Problem List Follow-up Recommendations:      -Total time spent on this encounter today including documentation and workup review, face to face time, history and exam, and documentation/orders was approximately 50 minutes.  -This note will be copied to [facility] EMR where vitals and medication orders are placed.    Ed Jones D.O.  Geriatric Medicine  10/17/2024 1:09 PM

## 2024-10-17 NOTE — ASSESSMENT & PLAN NOTE
Patient endorses chronic constipation at baseline typically going ~5 days between BM. Does not take anything for this at baseline.  At risk due to hospitalization, relative immobility, comorbidities, pain regimen  Monitor stool output - last BM yesterday normal/easy, she thinks constipation better being on bowel regimen recently  Bowel regimen at facility: continue miralax and senna and docusate regimen at rehab, adjust as appropriate; consider bisacodyl suppository PRN if no BM in 2-3 days  Encourage mobility as tolerated, PO hydration as appropriate, high fiber diet/prune juice (in outpatient setting as appropriate)  Goal is for 1 easy BM every 1-2 days  No colonoscopy results on file. Patient reported she had 1 colonoscopy around 10 years ago reportedly with some polyps removed and is interested in GI referral for updated colonoscopy. GI referral has been provided during rehab stay.  Follow up with PCP, GI as appropriate

## 2024-10-17 NOTE — ASSESSMENT & PLAN NOTE
Baseline Hb around 12-13 range  Recently acutely a bit lower/trending down - likely related to fall/ fractures, with likely some related mild internal bleeding  Seems overall mild and asymptomatic  Borderline macrocytic - B12 WNL as of 10/14 - continue B12 supplement which patient takes daily outpatient as well  Empirically started iron supplement as of 10/15  -monitor on routine labs - has stabilized/improving now  -monitor for acute bleed - no present signs externally  -stool occult blood test negative as of 10/15  -consider further workup, pelvic imaging to assess for hematoma, Heme consult if persistent/worsening  -transfuse PRN Hb <7  -low threshold to hold anticoagulation if worsening. As of 10/15 with shared decision making with patient given she is cleared for independent ambulation and much more mobile recently, have stopped lovenox and transitioned her to aspirin BID as above.  -Have educated patient extensively about risks of NSAID use including bleed risk and advised to not use NSAIDs on a scheduled or long-term basis  -Discussed close follow-up with PCP for repeat labs as appropriate in outpatient setting

## 2024-10-31 DIAGNOSIS — S32.810A MULTIPLE CLOSED FRACTURES OF PELVIS WITH STABLE DISRUPTION OF PELVIC RING, INITIAL ENCOUNTER (HCC): Primary | ICD-10-CM

## 2024-11-08 ENCOUNTER — TELEPHONE (OUTPATIENT)
Age: 74
End: 2024-11-08

## 2024-11-08 NOTE — TELEPHONE ENCOUNTER
Patient called General Surgery inquiring about an appointment with Orthopedics on 11/12/24. She wished to confirm the location. I did confirm that with the patient. She then asked about the [employer] message and stated her  mentioned about forms to be completed ahead of the appointment. Per her Mychart, her  Valentino is proxy. Patient stated that she gave him permission. Patient will complete the E-check-in with her .

## 2024-11-12 ENCOUNTER — HOSPITAL ENCOUNTER (OUTPATIENT)
Dept: RADIOLOGY | Facility: HOSPITAL | Age: 74
Discharge: HOME | End: 2024-11-12
Attending: ORTHOPAEDIC SURGERY
Payer: COMMERCIAL

## 2024-11-12 ENCOUNTER — OFFICE VISIT (OUTPATIENT)
Dept: OBGYN CLINIC | Facility: HOSPITAL | Age: 74
End: 2024-11-12
Payer: COMMERCIAL

## 2024-11-12 VITALS
HEART RATE: 80 BPM | BODY MASS INDEX: 22.08 KG/M2 | SYSTOLIC BLOOD PRESSURE: 116 MMHG | HEIGHT: 67 IN | DIASTOLIC BLOOD PRESSURE: 70 MMHG

## 2024-11-12 DIAGNOSIS — S32.810A MULTIPLE CLOSED FRACTURES OF PELVIS WITH STABLE DISRUPTION OF PELVIC RING, INITIAL ENCOUNTER (HCC): ICD-10-CM

## 2024-11-12 DIAGNOSIS — S32.82XD MULTIPLE CLOSED FRACTURES OF PELVIS WITHOUT DISRUPTION OF PELVIC RING WITH ROUTINE HEALING, SUBSEQUENT ENCOUNTER: Primary | ICD-10-CM

## 2024-11-12 PROCEDURE — 73502 X-RAY EXAM HIP UNI 2-3 VIEWS: CPT

## 2024-11-12 PROCEDURE — 99213 OFFICE O/P EST LOW 20 MIN: CPT | Performed by: ORTHOPAEDIC SURGERY

## 2024-11-12 NOTE — PROGRESS NOTES
Assessment:   Diagnosis ICD-10-CM Associated Orders   1. Multiple closed fractures of pelvis without disruption of pelvic ring with routine healing, subsequent encounter  S32.82XD           Plan:  74 y.o. female 5 weeks status post fall from standing height with right LC 1 pelvic fracture. Doing well overall.  Continue weightbearing activities as tolerated.  Continue to increase activity as tolerated.  Patient may follow up as needed.    The above stated was discussed in layman's terms and the patient expressed understanding.  All questions were answered to the patient's satisfaction.     To do next visit:  Return if symptoms worsen or fail to improve.      Subjective:   Catherine Lemon is a 74 y.o. female who presents 5 weeks status post fall from standing with right LC 1 pelvic fracture. She has been ambulating with a cane and has been able to mostly do activities as tolerated. She has had some pain over her right sacrum which has been well controlled and she has weaned to OTC anti-inflammatory medications for analgesia.       Review of systems negative unless otherwise specified in HPI    History reviewed. No pertinent past medical history.    History reviewed. No pertinent surgical history.    Family History   Problem Relation Age of Onset    No Known Problems Mother     Stomach cancer Father     No Known Problems Sister     No Known Problems Maternal Grandmother     No Known Problems Maternal Grandfather     No Known Problems Paternal Grandmother     No Known Problems Paternal Grandfather     No Known Problems Brother     No Known Problems Paternal Aunt        Social History     Occupational History    Not on file   Tobacco Use    Smoking status: Never    Smokeless tobacco: Never   Substance and Sexual Activity    Alcohol use: Never    Drug use: Not on file    Sexual activity: Not on file         Current Outpatient Medications:     acetaminophen (TYLENOL) 325 mg tablet, Take 3 tablets (975 mg total) by mouth  every 8 (eight) hours, Disp: 60 tablet, Rfl: 0    calcium carbonate (OYSTER SHELL,OSCAL) 500 mg, Take 2 tablets by mouth daily with breakfast, Disp: 60 tablet, Rfl: 0    Cholecalciferol (VITAMIN D3) 1,000 units tablet, Take 5 tablets (5,000 Units total) by mouth daily, Disp: 30 tablet, Rfl: 0    docusate sodium (COLACE) 100 mg capsule, Take 1 capsule (100 mg total) by mouth 2 (two) times a day, Disp: 14 capsule, Rfl: 0    ferrous sulfate 325 (65 Fe) mg tablet, Take 1 tablet (325 mg total) by mouth daily with breakfast, Disp: 30 tablet, Rfl: 0    lidocaine (LIDODERM) 5 %, Apply 1 patch topically over 12 hours daily Remove & Discard patch within 12 hours or as directed by MD, Disp: 5 patch, Rfl: 0    methocarbamol (ROBAXIN) 500 mg tablet, Take 2 tablets (1,000 mg total) by mouth every 8 (eight) hours as needed for muscle spasms, Disp: 60 tablet, Rfl: 0    senna (SENOKOT) 8.6 mg, Take 2 tablets (17.2 mg total) by mouth daily at bedtime, Disp: 30 tablet, Rfl: 0    aspirin 81 mg chewable tablet, Chew 1 tablet (81 mg total) 2 (two) times a day for 15 days Do not start before October 18, 2024., Disp: 30 tablet, Rfl: 0    polyethylene glycol (MIRALAX) 17 g packet, Take 17 g by mouth daily, Disp: 1 each, Rfl: 0    No Known Allergies         Vitals:    11/12/24 1136   BP: 116/70   Pulse: 80       Objective:  Physical exam  General: Awake, Alert, Oriented  Eyes: Pupils equal, round and reactive to light  Heart: regular rate and rhythm  Lungs: No audible wheezing  Abdomen: soft    Ortho Exam  No open wounds, no ecchymosis, no obvious deformity  Mild TTP over right sacrum, tenderness elicited with active flexion of the right hip.  Sensation intact in bilateral lower extremities in WHEELER/SA/SP/DP/Tibial distributions.  Motor intact to ankle plantarflexion/dorsiflexion, EHL/FHL.   Bilateral extremities are warm and well perfused.      Diagnostics, reviewed and taken today if performed as documented:    XR demonstrate no new evidence  "of fracture, dislocation, or other acute osseous abnormalities.      Procedures, if performed today:    None performed    Portions of the record may have been created with voice recognition software.  Occasional wrong word or \"sound a like\" substitutions may have occurred due to the inherent limitations of voice recognition software.  Read the chart carefully and recognize, using context, where substitutions have occurred.        "

## 2024-11-26 ENCOUNTER — HOSPITAL ENCOUNTER (OUTPATIENT)
Dept: ULTRASOUND IMAGING | Facility: CLINIC | Age: 74
Discharge: HOME | End: 2024-11-26
Payer: COMMERCIAL

## 2024-11-26 ENCOUNTER — HOSPITAL ENCOUNTER (OUTPATIENT)
Dept: MAMMOGRAPHY | Facility: CLINIC | Age: 74
Discharge: HOME | End: 2024-11-26
Payer: COMMERCIAL

## 2024-11-26 VITALS — WEIGHT: 141 LBS | BODY MASS INDEX: 22.13 KG/M2 | HEIGHT: 67 IN

## 2024-11-26 VITALS — HEIGHT: 67 IN | BODY MASS INDEX: 22.13 KG/M2 | WEIGHT: 141 LBS

## 2024-11-26 DIAGNOSIS — R92.30 DENSE BREASTS: ICD-10-CM

## 2024-11-26 DIAGNOSIS — Z12.39 ENCOUNTER FOR BREAST CANCER SCREENING USING NON-MAMMOGRAM MODALITY: ICD-10-CM

## 2024-11-26 DIAGNOSIS — Z12.31 ENCOUNTER FOR SCREENING MAMMOGRAM FOR BREAST CANCER: ICD-10-CM

## 2024-11-26 PROCEDURE — 77067 SCR MAMMO BI INCL CAD: CPT

## 2024-11-26 PROCEDURE — 77063 BREAST TOMOSYNTHESIS BI: CPT

## 2024-11-26 PROCEDURE — 76641 ULTRASOUND BREAST COMPLETE: CPT

## 2024-12-03 ENCOUNTER — HOSPITAL ENCOUNTER (OUTPATIENT)
Dept: BONE DENSITY | Facility: IMAGING CENTER | Age: 74
Discharge: HOME | End: 2024-12-03
Payer: COMMERCIAL

## 2024-12-03 VITALS — WEIGHT: 134.8 LBS | HEIGHT: 64 IN | BODY MASS INDEX: 23.01 KG/M2

## 2024-12-03 DIAGNOSIS — Z13.820 OSTEOPOROSIS SCREENING: ICD-10-CM

## 2024-12-03 PROCEDURE — 77080 DXA BONE DENSITY AXIAL: CPT
